# Patient Record
Sex: MALE | Race: BLACK OR AFRICAN AMERICAN | NOT HISPANIC OR LATINO | Employment: FULL TIME | URBAN - METROPOLITAN AREA
[De-identification: names, ages, dates, MRNs, and addresses within clinical notes are randomized per-mention and may not be internally consistent; named-entity substitution may affect disease eponyms.]

---

## 2019-01-04 ENCOUNTER — APPOINTMENT (EMERGENCY)
Dept: RADIOLOGY | Facility: HOSPITAL | Age: 44
End: 2019-01-04

## 2019-01-04 ENCOUNTER — HOSPITAL ENCOUNTER (EMERGENCY)
Facility: HOSPITAL | Age: 44
Discharge: HOME | End: 2019-01-04
Attending: EMERGENCY MEDICINE | Admitting: EMERGENCY MEDICINE

## 2019-01-04 VITALS
BODY MASS INDEX: 44.1 KG/M2 | DIASTOLIC BLOOD PRESSURE: 74 MMHG | TEMPERATURE: 98.9 F | HEART RATE: 102 BPM | RESPIRATION RATE: 18 BRPM | SYSTOLIC BLOOD PRESSURE: 146 MMHG | HEIGHT: 71 IN | OXYGEN SATURATION: 100 % | WEIGHT: 315 LBS

## 2019-01-04 DIAGNOSIS — L03.90 CELLULITIS: ICD-10-CM

## 2019-01-04 DIAGNOSIS — M25.475 SWELLING OF FIRST METATARSOPHALANGEAL (MTP) JOINT OF LEFT FOOT: Primary | ICD-10-CM

## 2019-01-04 PROCEDURE — 73630 X-RAY EXAM OF FOOT: CPT

## 2019-01-04 PROCEDURE — 99283 EMERGENCY DEPT VISIT LOW MDM: CPT

## 2019-01-04 RX ORDER — CEPHALEXIN 500 MG/1
500 CAPSULE ORAL EVERY 6 HOURS SCHEDULED
Qty: 28 CAPSULE | Refills: 0 | Status: SHIPPED | OUTPATIENT
Start: 2019-01-04 | End: 2019-01-11

## 2019-01-04 RX ORDER — CEPHALEXIN 250 MG/1
500 CAPSULE ORAL ONCE
Status: COMPLETED | OUTPATIENT
Start: 2019-01-04 | End: 2019-01-04

## 2019-01-04 RX ORDER — NAPROXEN 250 MG/1
500 TABLET ORAL ONCE
Status: COMPLETED | OUTPATIENT
Start: 2019-01-04 | End: 2019-01-04

## 2019-01-04 RX ORDER — PREDNISONE 20 MG/1
60 TABLET ORAL DAILY
Qty: 12 TABLET | Refills: 0 | Status: SHIPPED | OUTPATIENT
Start: 2019-01-04 | End: 2019-01-08

## 2019-01-04 RX ORDER — NAPROXEN 500 MG/1
500 TABLET ORAL 2 TIMES DAILY WITH MEALS
Qty: 20 TABLET | Refills: 0 | Status: SHIPPED | OUTPATIENT
Start: 2019-01-04 | End: 2019-01-14

## 2019-01-04 RX ORDER — HYDROCODONE BITARTRATE AND ACETAMINOPHEN 5; 325 MG/1; MG/1
1 TABLET ORAL EVERY 6 HOURS PRN
Qty: 12 TABLET | Refills: 0 | Status: SHIPPED | OUTPATIENT
Start: 2019-01-04 | End: 2019-01-07

## 2019-01-04 RX ORDER — PREDNISONE 20 MG/1
60 TABLET ORAL ONCE
Status: COMPLETED | OUTPATIENT
Start: 2019-01-04 | End: 2019-01-04

## 2019-01-04 RX ADMIN — PREDNISONE 60 MG: 20 TABLET ORAL at 14:40

## 2019-01-04 RX ADMIN — NAPROXEN 500 MG: 250 TABLET ORAL at 14:40

## 2019-01-04 RX ADMIN — CEPHALEXIN 500 MG: 250 CAPSULE ORAL at 14:41

## 2019-01-04 NOTE — ED PROVIDER NOTES
History  Chief Complaint   Patient presents with    Foot Pain     patient is c/o left foot pain x 1 week, denies injury or trauma  +swelling per patient  68-year-old male denies significant past medical history presenting chief complaint of left foot pain  Patient reports that he does have a history of recurrent gout which is been diagnosed clinically of his right 1st MTP joint is typically treat he reports that over the last week or so without inciting incident or injury he has had increasing pain and swelling to his left 1st MTP joint, the now radiates into his left mid proximal foot, it is worse with ambulating, he has not taken anything for this, he notes some mild pain redness swelling localized to his dorsum of his 1st left MPJ joint  Of note he notes that 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 in causes the appearance of swelling of his ankle but this is otherwise unchanged  Patient denies history of diabetes or IV drug use or other infectious risk factors he denies having fevers chills current viral symptoms other joint involvement injury complaints of headache chest pain cough shortness of breath change in activity nausea vomiting anorexia abdominal pain other change in bowels or bladder other joint pain swelling rashes or other associated symptoms  Complete review systems otherwise negative as noted            None       History reviewed  No pertinent past medical history  History reviewed  No pertinent surgical history  History reviewed  No pertinent family history  I have reviewed and agree with the history as documented  Social History   Substance Use Topics    Smoking status: Never Smoker    Smokeless tobacco: Never Used    Alcohol use Yes      Comment: occ        Review of Systems   Constitutional: Negative for activity change, appetite change, chills and fever  HENT: Negative for rhinorrhea and sore throat  Eyes: Negative for photophobia and pain     Respiratory: Negative for cough and shortness of breath  Cardiovascular: Negative for chest pain and palpitations  Gastrointestinal: Negative for abdominal pain, diarrhea, nausea and vomiting  Endocrine: Negative for polydipsia and polyphagia  Genitourinary: Negative for dysuria, frequency and urgency  Musculoskeletal: Positive for gait problem and joint swelling  Negative for arthralgias, back pain and myalgias  Left foot pain   Skin: Positive for color change  Negative for rash and wound  Allergic/Immunologic: Negative for immunocompromised state  Neurological: Negative for dizziness and weakness  Hematological: Negative for adenopathy  Does not bruise/bleed easily  Psychiatric/Behavioral: Negative for agitation and behavioral problems  All other systems reviewed and are negative  Physical Exam  Physical Exam   Constitutional: He is oriented to person, place, and time  He appears well-developed and well-nourished  No distress  Very well-appearing in no acute   HENT:   Head: Normocephalic and atraumatic  Eyes: Pupils are equal, round, and reactive to light  EOM are normal    Neck: Normal range of motion  Neck supple  No tracheal deviation present  Cardiovascular: Normal rate, regular rhythm and normal heart sounds  Exam reveals no gallop and no friction rub  No murmur heard  Pulmonary/Chest: Effort normal and breath sounds normal  He has no wheezes  He has no rales  Abdominal: Soft  Bowel sounds are normal  He exhibits no distension  There is no tenderness  There is no rebound and no guarding     Musculoskeletal:   Patient has mild swelling of his left 1st MTP joint there is some mild erythema over the dorsum of his left 1st MTP joint over the immediate distal 1st and 2nd metatarsals, patient has some mild discomfort with axial loading of his 1st left great toe otherwise there is no lymphangitis cap refill is brisk there is no warmth induration fluctuance he has no tenderness over the midfoot or calcaneus he has full range of motion of the ankle without discomfort warmth or notable effusion there is swelling over the ankle which patient notes his a lipoma but again full active and passive range of motion without discomfort there are no other acute ischemic infectious inflammatory traumatic findings   Neurological: He is alert and oriented to person, place, and time  No cranial nerve deficit  He exhibits normal muscle tone  Coordination normal    Skin: Skin is warm and dry  No rash noted  Psychiatric: He has a normal mood and affect  His behavior is normal    Nursing note and vitals reviewed  Vital Signs  ED Triage Vitals [01/04/19 1319]   Temperature Pulse Respirations Blood Pressure SpO2   98 9 °F (37 2 °C) 102 18 146/74 100 %      Temp Source Heart Rate Source Patient Position - Orthostatic VS BP Location FiO2 (%)   Oral Monitor Sitting Right arm --      Pain Score       8           Vitals:    01/04/19 1319   BP: 146/74   Pulse: 102   Patient Position - Orthostatic VS: Sitting       Visual Acuity      ED Medications  Medications   cephalexin (KEFLEX) capsule 500 mg (500 mg Oral Given 1/4/19 1441)   naproxen (NAPROSYN) tablet 500 mg (500 mg Oral Given 1/4/19 1440)   predniSONE tablet 60 mg (60 mg Oral Given 1/4/19 1440)       Diagnostic Studies  Results Reviewed     None                 XR foot 3+ views LEFT   Final Result by Blaine Richardson MD (01/04 1500)   Limited study  Generalized swelling of the foot  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  No acute osseous abnormality              Workstation performed: [id number]                    Procedures  Procedures       Phone Contacts  ED Phone Contact    ED Course  ED Course as of Jan 05 2047   Nikia Solorzano Jan 04, 2019   1514 Patient has some mild to moderate tenderness over his 1st MTP joint on left foot pain with axial loading reproduces symptoms there is some mild erythema the dorsum of his foot is well there is no fluctuance induration is clinically well without fevers has normal vital signs on exam, discussed risks benefits alternatives of possible arthrocentesis he has minimal swelling currently there is no apparent drainable collection, counseled extensively, may represent inflammatory arthritis and will treat as such although will cover with antibiotics and have an abundance of caution, there is not enough swelling to perform arthrocentesis rule out septic arthritis, will give Keflex for concern for possible overlying cellulitis on the dorsum of his left foot, very close return instructions if he is not improving if he develops worsening pain redness or swelling he has fevers at any time otherwise he will return for follow up with Podiatry for re-evaluation as instructed patient counseled extensively again understands to extensive discharge return follow-up instructions                                MDM  Number of Diagnoses or Management Options  Cellulitis:   Swelling of first metatarsophalangeal (MTP) joint of left foot:   Diagnosis management comments: 72-year-old male denies history of recurrent intermittent gout of his right 1st MTP joint it is been diagnosed clinically in the past and responded to symptom control here with 1 week of left 1st MTP joint radiates into his left midfoot, is worse with ambulation better with rest notes some mild swelling erythema immediate to the area he has no infectious risk factors no fevers chills or constitutional symptoms history of similar in the contralateral foot, on physical exam here he is afebrile normal vital signs the exception of mildly elevated blood pressure he is obese but clinically very well appearing his exam is otherwise as noted, there is not enough swelling to perform arthrocentesis of his left 1st MTP joint at this time he systemically well again without infectious risk factors discussed very remote possibility of septic arthritis of his 1st MTP joint however given his history and the muscles more likely inflammatory arthritis, less likely overlying cellulitis although after discussion with patient will treat symptomatically with NSAIDs prednisone, will give Keflex out of an abundance of caution crutches cast shoe for comfort pain control very close return instructions should he developed fevers or progression of symptoms at any time otherwise he will require podiatry follow-up close return instructions patient agreeable plan    CritCare Time    Disposition  Final diagnoses:   Swelling of first metatarsophalangeal (MTP) joint of left foot   Cellulitis     Time reflects when diagnosis was documented in both MDM as applicable and the Disposition within this note     Time User Action Codes Description Comment    1/4/2019  3:15 PM Anai Negro Add [M20 22] Rigidity of 1st MTP joint, left     1/4/2019  3:15 PM Mohan Lyn Add [M25 475] Swelling of first metatarsophalangeal (MTP) joint of left foot     1/4/2019  3:15 PM Wes Lyn Add [L03 90] Cellulitis     1/4/2019  3:16 PM Anai Negro Modify [M25 475] Swelling of first metatarsophalangeal (MTP) joint of left foot     1/4/2019  3:16 PM Mohan Lyn Remove [M20 22] Rigidity of 1st MTP joint, left       ED Disposition     ED Disposition Condition Comment    Discharge  [facility] discharge to home/self care      Condition at discharge: Good        Follow-up Information     Follow up With Specialties Details Why Contact Info Additional Information    [address] [facility] Emergency Department Emergency Medicine  If symptoms worsen [address] [facility] [address]  [phone] [place] ED, [address], [place], [facility]  2  In 1 week  [address]             Discharge Medication List as of 1/4/2019  3:18 PM      START taking these medications    Details   cephalexin (KEFLEX) 500 mg capsule Take 1 capsule (500 mg total) by mouth every 6 (six) hours for 7 days, Starting Fri 1/4/2019, Until Fri 1/11/2019, Print      HYDROcodone-acetaminophen (NORCO) 5-325 mg per tablet Take 1 tablet by mouth every 6 (six) hours as needed for pain for up to 3 days Max Daily Amount: 4 tablets, Starting Fri 1/4/2019, Until Mon 1/7/2019, Print      naproxen (NAPROSYN) 500 mg tablet Take 1 tablet (500 mg total) by mouth 2 (two) times a day with meals for 10 days, Starting Fri 1/4/2019, Until Mon 1/14/2019, Print      predniSONE 20 mg tablet Take 3 tablets (60 mg total) by mouth daily for 4 days, Starting Fri 1/4/2019, Until Tue 1/8/2019, Print           No discharge procedures on file      ED Provider  Electronically Signed by           Darwin Barr DO  01/05/19 204

## 2019-01-04 NOTE — DISCHARGE INSTRUCTIONS
As instructed you are to return immediately if this becomes worse you developed fevers with shaking chills  Or develop other concerning symptoms otherwise your Podiatry for re-evaluation as instructed as discussed      Cellulitis, Ambulatory Care   GENERAL INFORMATION:   Cellulitis  is a skin infection caused by bacteria  Common symptoms include the following:   · Fever    · A red, warm, swollen area on your skin    · Pain when the area is touched    · Bumps or blisters (abscess) that may drain pus    · Bumpy, raised skin that feels like an orange peel  Seek immediate care for the following symptoms:   · An increase in pain, redness, warmth, and size    · Red streaks coming from the infected area    · A thin, gray-brown discharge coming from your infected skin area    · A crackling under your skin when you touch it    · Purple dots or bumps on your skin, or bleeding under your skin    · New swelling and pain in your legs    · Sudden trouble breathing or chest pain  Treatment for cellulitis  may include medicines to treat the bacterial infection or decrease pain  The infection may need to be cleaned out  Damaged, dead, or infected tissue may need to be cut away to help your wound heal   Manage your symptoms:   · Elevate your wound above the level of your heart  as often as you can  This will help decrease swelling and pain  Prop your wound on pillows or blankets to keep it elevated comfortably  · Clean your wound as directed  You may need to wash the wound with soap and water  Look for signs of infection  · Wear pressure stockings as directed  The stockings are tight and put pressure on your legs  This improves blood flow and decreases swelling  Prevent cellulitis:   · Wash your hands often  Use soap and water  Wash your hands after you use the bathroom, change a child's diapers, or sneeze  Wash your hands before you prepare or eat food  Use lotion to prevent dry, cracked skin       · Do not share personal items, such as towels, clothing, and razors  · Clean exercise equipment  with germ-killing  before and after you use it  Follow up with your healthcare provider as directed:  Write down your questions so you remember to ask them during your visits  CARE AGREEMENT:   You have the right to help plan your care  Learn about your health condition and how it may be treated  Discuss treatment options with your caregivers to decide what care you want to receive  You always have the right to refuse treatment  The above information is an  only  It is not intended as medical advice for individual conditions or treatments  Talk to your doctor, nurse or pharmacist before following any medical regimen to see if it is safe and effective for you  © 2014 [address] is for End User's use only and may not be sold, redistributed or otherwise used for commercial purposes  All illustrations and images included in CareNotes® are the copyrighted property of [employer]  or Gamaliel Watts  Gout   WHAT YOU NEED TO KNOW:   Gout is a form of arthritis that causes severe joint pain, redness, swelling, and stiffness  Acute gout pain starts suddenly, gets worse quickly, and stops on its own  Acute gout can become chronic and cause permanent damage to the joints  DISCHARGE INSTRUCTIONS:   Return to the emergency department if:   · You have severe pain in one or more of your joints that you cannot tolerate  · You have a fever or redness that spreads beyond the joint area  Contact your healthcare provider if:   · You have new symptoms, such as a rash, after you start gout treatment  · Your joint pain and swelling do not go away, even after treatment  · You are not urinating as much or as often as you usually do  · You have trouble taking your gout medicines  · You have questions or concerns about your condition or care  Medicines:   You may need any of the following:  · Prescription pain medicine  may be given  Ask your healthcare provider how to take this medicine safely  Some prescription pain medicines contain acetaminophen  Do not take other medicines that contain acetaminophen without talking to your healthcare provider  Too much acetaminophen may cause liver damage  Prescription pain medicine may cause constipation  Ask your healthcare provider how to prevent or treat constipation  · NSAIDs , such as ibuprofen, help decrease swelling, pain, and fever  This medicine is available with or without a doctor's order  NSAIDs can cause stomach bleeding or kidney problems in certain people  If you take blood thinner medicine, always ask your healthcare provider if NSAIDs are safe for you  Always read the medicine label and follow directions  · Gout medicine  decreases joint pain and swelling  It may also be given to prevent new gout attacks  · Steroids  reduce inflammation and can help your joint stiffness and pain during gout attacks  · Uric acid medicine  may be given to reduce the amount of uric acid your body makes  Some medicines may help you pass more uric acid when you urinate  · Take your medicine as directed  Contact your healthcare provider if you think your medicine is not helping or if you have side effects  Tell him or her if you are allergic to any medicine  Keep a list of the medicines, vitamins, and herbs you take  Include the amounts, and when and why you take them  Bring the list or the pill bottles to follow-up visits  Carry your medicine list with you in case of an emergency  Follow up with your healthcare provider as directed:  Write down your questions so you remember to ask them during your visits  Manage gout:   · Rest your painful joint so it can heal   Your healthcare provider may recommend crutches or a walker if the affected joint is in a leg  · Apply ice to your joint  Ice decreases pain and swelling   Use an ice pack, or put crushed ice in a plastic bag  Cover the ice pack or bag with a towel before you apply it to your painful joint  Apply ice for 15 to 20 minutes every hour, or as directed  · Elevate your joint  Elevation helps reduce swelling and pain  Raise your joint above the level of your heart as often as you can  Prop your painful joint on pillows to keep it above your heart comfortably  · Go to physical therapy if directed  A physical therapist can teach you exercises to improve flexibility and range of motion  Prevent gout attacks:   · Do not eat high-purine foods  These foods include meats, seafood, asparagus, spinach, cauliflower, and some types of beans  Healthcare providers may tell you to eat more low-fat milk products, such as yogurt  Milk products may decrease your risk for gout attacks  Vitamin C and coffee may also help  Your healthcare provider or dietitian can help you create a meal plan  · Drink more liquids as directed  Liquids such as water help remove uric acid from your body  Ask how much liquid to drink each day and which liquids are best for you  · Manage your weight  Weight loss may decrease the amount of uric acid in your body  Exercise can help you lose weight  Talk to your healthcare provider about the best exercises for you  · Control your blood sugar level if you have diabetes  Keep your blood sugar level in a normal range  This can help prevent gout attacks  · Limit or do not drink alcohol as directed  Alcohol can trigger a gout attack  Alcohol also increases your risk for dehydration  Ask your healthcare provider if alcohol is safe for you  © 2017 [address] Information is for End User's use only and may not be sold, redistributed or otherwise used for commercial purposes  All illustrations and images included in CareNotes® are the copyrighted property of [employer]  or [employer]aliel Watts    The above information is an educational aid only  It is not intended as medical advice for individual conditions or treatments  Talk to your doctor, nurse or pharmacist before following any medical regimen to see if it is safe and effective for you

## 2021-03-17 ENCOUNTER — APPOINTMENT (EMERGENCY)
Dept: RADIOLOGY | Facility: HOSPITAL | Age: 46
End: 2021-03-17

## 2021-03-17 ENCOUNTER — HOSPITAL ENCOUNTER (EMERGENCY)
Facility: HOSPITAL | Age: 46
Discharge: HOME | End: 2021-03-17
Attending: EMERGENCY MEDICINE | Admitting: EMERGENCY MEDICINE
Payer: COMMERCIAL

## 2021-03-17 VITALS
BODY MASS INDEX: 58.58 KG/M2 | HEART RATE: 83 BPM | DIASTOLIC BLOOD PRESSURE: 81 MMHG | RESPIRATION RATE: 19 BRPM | OXYGEN SATURATION: 99 % | TEMPERATURE: 98.3 F | SYSTOLIC BLOOD PRESSURE: 174 MMHG | WEIGHT: 315 LBS

## 2021-03-17 DIAGNOSIS — M25.532 LEFT WRIST PAIN: Primary | ICD-10-CM

## 2021-03-17 PROCEDURE — 73110 X-RAY EXAM OF WRIST: CPT

## 2021-03-17 PROCEDURE — 99283 EMERGENCY DEPT VISIT LOW MDM: CPT

## 2021-03-17 PROCEDURE — 96372 THER/PROPH/DIAG INJ SC/IM: CPT

## 2021-03-17 PROCEDURE — 99284 EMERGENCY DEPT VISIT MOD MDM: CPT | Performed by: EMERGENCY MEDICINE

## 2021-03-17 RX ORDER — PREDNISONE 20 MG/1
40 TABLET ORAL DAILY
Qty: 10 TABLET | Refills: 0 | Status: SHIPPED | OUTPATIENT
Start: 2021-03-17 | End: 2021-03-22

## 2021-03-17 RX ORDER — KETOROLAC TROMETHAMINE 30 MG/ML
15 INJECTION, SOLUTION INTRAMUSCULAR; INTRAVENOUS ONCE
Status: COMPLETED | OUTPATIENT
Start: 2021-03-17 | End: 2021-03-17

## 2021-03-17 RX ADMIN — PREDNISONE 50 MG: 20 TABLET ORAL at 20:35

## 2021-03-17 RX ADMIN — KETOROLAC TROMETHAMINE 15 MG: 30 INJECTION, SOLUTION INTRAMUSCULAR at 19:39

## 2021-03-17 NOTE — Clinical Note
Yvonne Alexa was seen and treated in our emergency department on 3/17/2021  No use of L wrist for 7 days    Diagnosis:     Dwayne Ferguson  may return to work on return date  He may return on this date: 03/18/2021         If you have any questions or concerns, please don't hesitate to call        Erna Bernstein MD    ______________________________           _______________          _______________  Hospital Representative                              Date                                Time

## 2021-03-17 NOTE — Clinical Note
Deonna Mccoygo was seen and treated in our emergency department on 3/17/2021  No use of L wrist for 7 days    Diagnosis:     Acndelario Ao  may return to work on return date  He may return on this date: 03/18/2021         If you have any questions or concerns, please don't hesitate to call        Tammy Watts MD    ______________________________           _______________          _______________  Hospital Representative                              Date                                Time

## 2021-03-24 NOTE — ED PROVIDER NOTES
History  Chief Complaint   Patient presents with    Wrist Pain     L wrist pain for 3 days  unknown injury, hx of gout      38 yo m presenting to the emergency department for eval of wrist pain  Has aching left wrist pain and swelling, no injury, no numbness or tingling, no radiation  has hx of gout and ws treated with prednisone in 2019 for similar sx in foot  Prior to Admission Medications   Prescriptions Last Dose Informant Patient Reported? Taking?   naproxen (NAPROSYN) 500 mg tablet   No No   Sig: Take 1 tablet (500 mg total) by mouth 2 (two) times a day with meals for 10 days      Facility-Administered Medications: None       Past Medical History:   Diagnosis Date    Gout        History reviewed  No pertinent surgical history  History reviewed  No pertinent family history  I have reviewed and agree with the history as documented  E-Cigarette/Vaping     E-Cigarette/Vaping Substances     Social History     Tobacco Use    Smoking status: Never Smoker    Smokeless tobacco: Never Used   Substance Use Topics    Alcohol use: Yes     Comment: occ    Drug use: No       Review of Systems   Constitutional: Negative for chills and fever  HENT: Negative for ear pain and sore throat  Eyes: Negative for pain and visual disturbance  Respiratory: Negative for cough and shortness of breath  Cardiovascular: Negative for chest pain and palpitations  Gastrointestinal: Negative for abdominal pain and vomiting  Genitourinary: Negative for dysuria and hematuria  Musculoskeletal: Positive for arthralgias  Negative for back pain  Skin: Negative for color change and rash  Neurological: Negative for seizures and syncope  All other systems reviewed and are negative  Physical Exam  Physical Exam  Vitals signs and nursing note reviewed  Constitutional:       General: He is not in acute distress  Appearance: He is well-developed  He is not diaphoretic     HENT:      Head: Normocephalic and atraumatic  Eyes:      Pupils: Pupils are equal, round, and reactive to light  Neck:      Vascular: No JVD  Trachea: No tracheal deviation  Cardiovascular:      Rate and Rhythm: Normal rate and regular rhythm  Heart sounds: Normal heart sounds  No murmur  No friction rub  No gallop  Pulmonary:      Effort: Pulmonary effort is normal  No respiratory distress  Breath sounds: Normal breath sounds  No wheezing or rales  Abdominal:      General: Bowel sounds are normal  There is no distension  Palpations: Abdomen is soft  Tenderness: There is no abdominal tenderness  There is no guarding or rebound  Musculoskeletal:      Left wrist: He exhibits tenderness and swelling  Skin:     General: Skin is warm and dry  Coloration: Skin is not pale  Neurological:      Mental Status: He is alert and oriented to person, place, and time  Cranial Nerves: No cranial nerve deficit  Motor: No abnormal muscle tone     Psychiatric:         Behavior: Behavior normal          Vital Signs  ED Triage Vitals   Temperature Pulse Respirations Blood Pressure SpO2   03/17/21 1859 03/17/21 1859 03/17/21 1859 03/17/21 1859 03/17/21 1859   98 3 °F (36 8 °C) 83 19 (!) 174/81 99 %      Temp Source Heart Rate Source Patient Position - Orthostatic VS BP Location FiO2 (%)   03/17/21 1859 03/17/21 1859 03/17/21 1859 03/17/21 1859 --   Oral Monitor Sitting Left arm       Pain Score       03/17/21 1939       9           Vitals:    03/17/21 1859   BP: (!) 174/81   Pulse: 83   Patient Position - Orthostatic VS: Sitting         Visual Acuity      ED Medications  Medications   ketorolac (TORADOL) injection 15 mg (15 mg Intramuscular Given 3/17/21 1939)   predniSONE tablet 50 mg (50 mg Oral Given 3/17/21 2035)       Diagnostic Studies  Results Reviewed     None                 XR wrist 3+ views LEFT   ED Interpretation by Kendrick Figueredo MD (03/17 2015)   No acute osseous abnormalities      Final Result by Lois Monaco MD (03/18 7073)      No acute osseous abnormality  Workstation performed: [id number]                    Procedures  Procedures         ED Course                             SBIRT 20yo+      Most Recent Value   SBIRT (25 yo +)   In order to provide better care to our patients, we are screening all of our patients for alcohol and drug use  Would it be okay to ask you these screening questions? Yes Filed at: 03/17/2021 1929   Initial Alcohol Screen: US AUDIT-C    1  How often do you have a drink containing alcohol? 2 Filed at: 03/17/2021 1929   2  How many drinks containing alcohol do you have on a typical day you are drinking? 0 Filed at: 03/17/2021 1929   3a  Male UNDER 65: How often do you have five or more drinks on one occasion? 0 Filed at: 03/17/2021 1929   3b  FEMALE Any Age, or MALE 65+: How often do you have 4 or more drinks on one occassion? 0 Filed at: 03/17/2021 1929   Audit-C Score  2 Filed at: 03/17/2021 1929   ESVIN: How many times in the past year have you    Used an illegal drug or used a prescription medication for non-medical reasons? Never Filed at: 03/17/2021 1929                    MDM  Number of Diagnoses or Management Options  Left wrist pain:   Diagnosis management comments: 38 yo m presenting to the emergency department with atraumatic joint swelling, will check xr but also give nsaid and prednisone  Disposition  Final diagnoses:   Left wrist pain     Time reflects when diagnosis was documented in both MDM as applicable and the Disposition within this note     Time User Action Codes Description Comment    3/17/2021  8:20 PM Ihsan Alonso Add [M25 532] Left wrist pain       ED Disposition     ED Disposition Condition Date/Time Comment    Discharge Stable Wed Mar 17, 2021  8:20 PM [facility] discharge to home/self care              Follow-up Information     Follow up With Specialties Details Why Contact Info [facility]   [address] [facility]  Zane Segura [address] [phone] [address] [facility], [address] [address] [facility], [address] [place] [address], [address] [facility]          Discharge Medication List as of 3/17/2021  8:33 PM      START taking these medications    Details   predniSONE 20 mg tablet Take 2 tablets (40 mg total) by mouth daily for 5 days, Starting Wed 3/17/2021, Until Mon 3/22/2021, Print         CONTINUE these medications which have NOT CHANGED    Details   naproxen (NAPROSYN) 500 mg tablet Take 1 tablet (500 mg total) by mouth 2 (two) times a day with meals for 10 days, Starting Fri 1/4/2019, Until Mon 1/14/2019, Print           No discharge procedures on file      PDMP Review     None          ED Provider  Electronically Signed by           Ko Nesbitt MD  03/25/21 7673

## 2021-04-01 ENCOUNTER — OFFICE VISIT (OUTPATIENT)
Dept: OBGYN CLINIC | Facility: CLINIC | Age: 46
End: 2021-04-01
Payer: COMMERCIAL

## 2021-04-01 VITALS
HEART RATE: 83 BPM | HEIGHT: 71 IN | SYSTOLIC BLOOD PRESSURE: 129 MMHG | DIASTOLIC BLOOD PRESSURE: 85 MMHG | BODY MASS INDEX: 44.1 KG/M2 | WEIGHT: 315 LBS

## 2021-04-01 DIAGNOSIS — R20.0 NUMBNESS AND TINGLING IN LEFT HAND: ICD-10-CM

## 2021-04-01 DIAGNOSIS — S66.912A STRAIN OF LEFT WRIST, INITIAL ENCOUNTER: Primary | ICD-10-CM

## 2021-04-01 DIAGNOSIS — R20.2 NUMBNESS AND TINGLING IN LEFT HAND: ICD-10-CM

## 2021-04-01 PROCEDURE — 99203 OFFICE O/P NEW LOW 30 MIN: CPT | Performed by: ORTHOPAEDIC SURGERY

## 2021-04-01 PROCEDURE — 20605 DRAIN/INJ JOINT/BURSA W/O US: CPT | Performed by: ORTHOPAEDIC SURGERY

## 2021-04-01 RX ORDER — TRIAMCINOLONE ACETONIDE 40 MG/ML
20 INJECTION, SUSPENSION INTRA-ARTICULAR; INTRAMUSCULAR
Status: COMPLETED | OUTPATIENT
Start: 2021-04-01 | End: 2021-04-01

## 2021-04-01 RX ORDER — LIDOCAINE HYDROCHLORIDE 10 MG/ML
0.5 INJECTION, SOLUTION INFILTRATION; PERINEURAL
Status: COMPLETED | OUTPATIENT
Start: 2021-04-01 | End: 2021-04-01

## 2021-04-01 RX ADMIN — LIDOCAINE HYDROCHLORIDE 0.5 ML: 10 INJECTION, SOLUTION INFILTRATION; PERINEURAL at 11:02

## 2021-04-01 RX ADMIN — TRIAMCINOLONE ACETONIDE 20 MG: 40 INJECTION, SUSPENSION INTRA-ARTICULAR; INTRAMUSCULAR at 11:02

## 2021-04-01 NOTE — PROGRESS NOTES
CHIEF COMPLAINT:  Chief Complaint   Patient presents with    Left Wrist - Pain       SUBJECTIVE:  Michell Lima is a 39y o  year old  male who presents to the office for evaluation of his left wrist  Pt was seen at ED 3/17/2021 due to pain in his left wrist that had been ongoing for 3 days  Patient denies injury  Patient does have history of gout in his foot and was treated with prednisone in 2019  Today patient states that  The prednisone that he was prescribed at the emergency department provided him with resolution of of the swelling and some pain improvement  PAST MEDICAL HISTORY:  Past Medical History:   Diagnosis Date    Gout        PAST SURGICAL HISTORY:  History reviewed  No pertinent surgical history  FAMILY HISTORY:  History reviewed  No pertinent family history  SOCIAL HISTORY:  Social History     Tobacco Use    Smoking status: Never Smoker    Smokeless tobacco: Never Used   Substance Use Topics    Alcohol use: Yes     Comment: occ    Drug use: No       MEDICATIONS:    Current Outpatient Medications:     naproxen (NAPROSYN) 500 mg tablet, Take 1 tablet (500 mg total) by mouth 2 (two) times a day with meals for 10 days, Disp: 20 tablet, Rfl: 0    ALLERGIES:  Allergies   Allergen Reactions    Eggs Or Egg-Derived Products - Food Allergy Hives    Milk-Related Compounds - Food Allergy GI Intolerance    Shellfish-Derived Products - Food Allergy Hives       REVIEW OF SYSTEMS:  Review of Systems   Constitutional: Negative for chills, fever and unexpected weight change  HENT: Negative for hearing loss, nosebleeds and sore throat  Eyes: Negative for pain, redness and visual disturbance  Respiratory: Negative for cough, shortness of breath and wheezing  Cardiovascular: Negative for chest pain, palpitations and leg swelling  Gastrointestinal: Negative for abdominal pain, nausea and vomiting  Endocrine: Negative for polydipsia and polyuria     Genitourinary: Negative for dysuria and hematuria  Skin: Negative for rash and wound  Neurological: Negative for dizziness, light-headedness and headaches  Psychiatric/Behavioral: Negative for decreased concentration, dysphoric mood and suicidal ideas  The patient is not nervous/anxious  VITALS:  Vitals:    04/01/21 1022   BP: 129/85   Pulse: 83       LABS:  HgA1c: No results found for: HGBA1C  BMP: No results found for: GLUCOSE, CALCIUM, NA, K, CO2, CL, BUN, CREATININE    _____________________________________________________  PHYSICAL EXAMINATION:  General: well developed and well nourished, alert, oriented times 3 and appears comfortable  Psychiatric: Normal  HEENT: Trachea Midline, No torticollis  Pulmonary: No audible wheezing or strider  Cardiovascular: No discernable arrhythmia   Skin: No masses, erythema, lacerations, fluctation, ulcerations  Neurovascular: Sensation Intact to the Median, Ulnar, Radial Nerve, Motor Intact to the Median, Ulnar, Radial Nerve and Pulses Intact    MUSCULOSKELETAL EXAMINATION:  Left wrist    no swelling erythema or ecchymosis  Tender palpation of the ulnar carpal ligament   Two point discrimination is 4 mm throughout  ___________________________________________________  STUDIES REVIEWED:  xrays of the left wrist performed 3/17/2021 show no acute fracture or dislocation       PROCEDURES PERFORMED:  Medium joint arthrocentesis: L ulnocarpal  Universal Protocol:  Procedure performed by:  Consent: Verbal consent obtained    Risks and benefits: risks, benefits and alternatives were discussed  Consent given by: patient  Patient understanding: patient states understanding of the procedure being performed  Patient consent: the patient's understanding of the procedure matches consent given  Procedure consent: procedure consent matches procedure scheduled  Site marked: the operative site was marked  Supporting Documentation  Indications: pain   Procedure Details  Location: wrist - L ulnocarpal  Preparation: Patient was prepped and draped in the usual sterile fashion  Ultrasound guidance: no  Medications administered: 0 5 mL lidocaine 1 %; 20 mg triamcinolone acetonide 40 mg/mL    Patient tolerance: patient tolerated the procedure well with no immediate complications  Dressing:  Sterile dressing applied            _____________________________________________________  ASSESSMENT/PLAN:  Left ulnocarpal ligament strain   - CSI was offered and accepted   - CSI was administered without complications  - Pt was provided with a note to return to work per his request 4/8/2021    Left hand numbness and tingling  - LUE EDx was ordered and pt will follow up in the office after       Follow Up:  Return in about 2 weeks (around 4/15/2021)      Work/school status:  RTW without limitations 4/8/2021    To Do Next Visit:  Re-evaluation of current issue      Scribe Attestation    I,:  Asuncion Bonilla am acting as a scribe while in the presence of the attending physician :       I,:  Alli Hyatt MD personally performed the services described in this documentation    as scribed in my presence :

## 2021-04-01 NOTE — LETTER
April 1, 2021     Patient: Meliza Baugh   YOB: 1975   Date of Visit: 4/1/2021       To Whom it May Concern:    Meliza Baugh is under my professional care  He was seen in my office on 4/1/2021  He may return to work without limitations 4/8/2021  If you have any questions or concerns, please don't hesitate to call           Sincerely,          Alli Hyatt MD        CC: No Recipients

## 2021-05-18 ENCOUNTER — TELEPHONE (OUTPATIENT)
Dept: NEUROLOGY | Facility: CLINIC | Age: 46
End: 2021-05-18

## 2023-02-13 ENCOUNTER — HOSPITAL ENCOUNTER (EMERGENCY)
Facility: HOSPITAL | Age: 48
Discharge: HOME | End: 2023-02-13
Attending: EMERGENCY MEDICINE

## 2023-02-13 VITALS
HEART RATE: 100 BPM | RESPIRATION RATE: 16 BRPM | DIASTOLIC BLOOD PRESSURE: 89 MMHG | TEMPERATURE: 98 F | OXYGEN SATURATION: 98 % | SYSTOLIC BLOOD PRESSURE: 140 MMHG

## 2023-02-13 DIAGNOSIS — J02.9 PHARYNGITIS: ICD-10-CM

## 2023-02-13 DIAGNOSIS — M10.9 GOUTY ARTHRITIS OF LEFT GREAT TOE: Primary | ICD-10-CM

## 2023-02-13 RX ORDER — OXYCODONE HYDROCHLORIDE AND ACETAMINOPHEN 5; 325 MG/1; MG/1
1 TABLET ORAL ONCE
Status: COMPLETED | OUTPATIENT
Start: 2023-02-13 | End: 2023-02-13

## 2023-02-13 RX ORDER — KETOROLAC TROMETHAMINE 30 MG/ML
15 INJECTION, SOLUTION INTRAMUSCULAR; INTRAVENOUS ONCE
Status: COMPLETED | OUTPATIENT
Start: 2023-02-13 | End: 2023-02-13

## 2023-02-13 RX ORDER — PREDNISONE 10 MG/1
TABLET ORAL
Qty: 30 TABLET | Refills: 0 | Status: SHIPPED | OUTPATIENT
Start: 2023-02-13 | End: 2023-02-13 | Stop reason: CLARIF

## 2023-02-13 RX ORDER — NAPROXEN 500 MG/1
500 TABLET ORAL 2 TIMES DAILY WITH MEALS
Qty: 30 TABLET | Refills: 0 | Status: SHIPPED | OUTPATIENT
Start: 2023-02-13 | End: 2023-02-13 | Stop reason: CLARIF

## 2023-02-13 RX ORDER — AZITHROMYCIN 250 MG/1
TABLET, FILM COATED ORAL
Qty: 6 TABLET | Refills: 0 | Status: SHIPPED | OUTPATIENT
Start: 2023-02-13 | End: 2023-02-17

## 2023-02-13 RX ORDER — PREDNISONE 10 MG/1
TABLET ORAL
Qty: 30 TABLET | Refills: 0 | Status: SHIPPED | OUTPATIENT
Start: 2023-02-13

## 2023-02-13 RX ORDER — NAPROXEN 500 MG/1
500 TABLET ORAL 2 TIMES DAILY WITH MEALS
Qty: 30 TABLET | Refills: 0 | Status: SHIPPED | OUTPATIENT
Start: 2023-02-13

## 2023-02-13 RX ORDER — PREDNISONE 20 MG/1
40 TABLET ORAL ONCE
Status: COMPLETED | OUTPATIENT
Start: 2023-02-13 | End: 2023-02-13

## 2023-02-13 RX ADMIN — PREDNISONE 40 MG: 20 TABLET ORAL at 11:05

## 2023-02-13 RX ADMIN — KETOROLAC TROMETHAMINE 15 MG: 30 INJECTION, SOLUTION INTRAMUSCULAR at 11:05

## 2023-02-13 RX ADMIN — OXYCODONE HYDROCHLORIDE AND ACETAMINOPHEN 1 TABLET: 5; 325 TABLET ORAL at 11:05

## 2023-02-13 NOTE — ED PROVIDER NOTES
History  Chief Complaint   Patient presents with   • Foot Swelling     Left sided foot swelling, thinks it may be gout     Bob Schulz is a 47-year-old male with past medical history including gout arriving to the emergency department for evaluation with chief complaint of pain in the left great toe since last night  Patient reports that the location of pain/swelling is similar to prior flares of his gout  He reports that he was started on penicillin Thursday for treatment of strep throat  He is unsure if this had triggered this gout flare  He states that certain beers/foods usually trigger his gout flares but he denies any ingestion of foods known to flare his gout  He denies fevers, chills, sweats, extremity tingling or weakness  He offers no other complaints or concerns at this time  Prior to Admission Medications   Prescriptions Last Dose Informant Patient Reported? Taking?   naproxen (NAPROSYN) 500 mg tablet   No No   Sig: Take 1 tablet (500 mg total) by mouth 2 (two) times a day with meals for 10 days      Facility-Administered Medications: None       Past Medical History:   Diagnosis Date   • Gout        History reviewed  No pertinent surgical history  History reviewed  No pertinent family history  I have reviewed and agree with the history as documented  E-Cigarette/Vaping     E-Cigarette/Vaping Substances     Social History     Tobacco Use   • Smoking status: Never   • Smokeless tobacco: Never   Substance Use Topics   • Alcohol use: Yes     Comment: occ   • Drug use: No       Review of Systems   Constitutional: Negative for chills, diaphoresis and fever  Musculoskeletal: Positive for arthralgias and joint swelling  Left great toe       Physical Exam  Physical Exam  Vitals and nursing note reviewed  Constitutional:       General: He is not in acute distress  Appearance: Normal appearance  He is obese  He is not ill-appearing, toxic-appearing or diaphoretic     HENT: Head: Normocephalic and atraumatic  Right Ear: External ear normal       Left Ear: External ear normal    Eyes:      Conjunctiva/sclera: Conjunctivae normal    Cardiovascular:      Rate and Rhythm: Normal rate  Pulmonary:      Effort: Pulmonary effort is normal    Musculoskeletal:      Cervical back: Normal range of motion and neck supple  Feet:      Left foot:      Skin integrity: No skin breakdown  Comments: Erythema and edema overlying the left 1st MTP with tenderness upon palpation  Patient able to wiggle all toes and range the foot/ankle without issue  Distal sensation intact, and normal capillary refill in all toes  DP pulse 2+  Skin:     General: Skin is warm and dry  Capillary Refill: Capillary refill takes less than 2 seconds  Neurological:      Mental Status: He is alert  Vital Signs  ED Triage Vitals [02/13/23 1013]   Temperature Pulse Respirations Blood Pressure SpO2   98 °F (36 7 °C) 100 16 140/89 98 %      Temp src Heart Rate Source Patient Position - Orthostatic VS BP Location FiO2 (%)   -- -- -- -- --      Pain Score       --           Vitals:    02/13/23 1013   BP: 140/89   Pulse: 100         Visual Acuity      ED Medications  Medications   predniSONE tablet 40 mg (40 mg Oral Given 2/13/23 1105)   oxyCODONE-acetaminophen (PERCOCET) 5-325 mg per tablet 1 tablet (1 tablet Oral Given 2/13/23 1105)   ketorolac (TORADOL) injection 15 mg (15 mg Intramuscular Given 2/13/23 1105)       Diagnostic Studies  Results Reviewed     None                 No orders to display              Procedures  Procedures         ED Course                               SBIRT 20yo+    Flowsheet Row Most Recent Value   SBIRT (23 yo +)    In order to provide better care to our patients, we are screening all of our patients for alcohol and drug use  Would it be okay to ask you these screening questions?  No Filed at: 02/13/2023 1110                    Medical Decision Making  MDM: 53-year-old male presenting for evaluation of pain and swelling in the left great toe, stating symptoms feel like prior gout flares  He has no systemic complaints  He is neurovascularly intact  Infectious etiology felt to be less likely  Patient declined x-ray  Will treat gout with prednisone, first dose given in ED  Recommended continuing medications as directed and considering outpatient podiatry follow-up which is included in his discharge paperwork  Parameters for ED return discussed at length  Patient comfortable and agreeable with discharge, and was discharged in stable condition  Pharyngitis: acute illness or injury  Risk  Prescription drug management  Disposition  Final diagnoses:   Pharyngitis   Gouty arthritis of left great toe     Time reflects when diagnosis was documented in both MDM as applicable and the Disposition within this note     Time User Action Codes Description Comment    2/13/2023 10:56 AM Ankur Hands Add [M10 9] Gouty arthritis of right great toe     2/13/2023 10:56 AM Ankur Hands Add [J02 9] Pharyngitis     2/13/2023  2:22 PM Ankur Hands Add [M10 9] Gouty arthritis of left great toe     2/13/2023  2:22 PM Ankur Hands Modify [M10 9] Gouty arthritis of right great toe     2/13/2023  2:22 PM Ankur Hands Modify [M10 9] Gouty arthritis of left great toe     2/13/2023  2:22 PM Ankur Hands Modify [J02 9] Pharyngitis     2/13/2023  2:22 PM Ankur Hands Modify [M10 9] Gouty arthritis of left great toe     2/13/2023  2:22 PM Ankur Hands Remove [M10 9] Gouty arthritis of right great toe       ED Disposition     ED Disposition   Discharge    Condition   Stable    Date/Time   Mon Feb 13, 2023 10:56 AM    Comment   Leslee Ho discharge to home/self care                 Follow-up Information     Follow up With Specialties Details Why Contact Info Additional [address] [facility] Internal Medicine   [address] [place] [address] [facility], [address] [place], [place], [address] [facility]    [facility]  Call   [address] [facility]  [place] [address]  [phone]     [facility] Emergency Department Emergency Medicine  If symptoms worsen [address] Emergency Department, [address] [place], [place], [address]          Discharge Medication List as of 2/13/2023 11:05 AM      START taking these medications    Details   azithromycin (Zithromax Z-Chuck) 250 mg tablet Take 2 tablets today then 1 tablet daily x 4 days, Normal      predniSONE 10 mg tablet 50 mg for 2 days, 40 mg for 2 days, 30 mg for 2 days, 20 mg for 2 days, 10 mg for 2 days, Normal         CONTINUE these medications which have CHANGED    Details   naproxen (Naprosyn) 500 mg tablet Take 1 tablet (500 mg total) by mouth 2 (two) times a day with meals, Starting Mon 2/13/2023, Normal             No discharge procedures on file      PDMP Review     None          ED Provider  Electronically Signed by           Emily Rowe PA-C  02/13/23 3699

## 2023-02-13 NOTE — DISCHARGE INSTRUCTIONS
Rest, ice, tylenol/naproxen for pain relief, and continue prednisone course as directed  Consider establishing with Podiatry  Return to the ED with any new or worsening symptoms as discussed

## 2025-04-14 ENCOUNTER — HOSPITAL ENCOUNTER (OUTPATIENT)
Facility: HOSPITAL | Age: 50
Setting detail: OBSERVATION
Discharge: HOME | End: 2025-04-16
Attending: STUDENT IN AN ORGANIZED HEALTH CARE EDUCATION/TRAINING PROGRAM | Admitting: STUDENT IN AN ORGANIZED HEALTH CARE EDUCATION/TRAINING PROGRAM
Payer: COMMERCIAL

## 2025-04-14 DIAGNOSIS — R73.9 HYPERGLYCEMIA: ICD-10-CM

## 2025-04-14 DIAGNOSIS — R03.0 ELEVATED BLOOD PRESSURE READING: ICD-10-CM

## 2025-04-14 DIAGNOSIS — E11.9 DIABETES MELLITUS, NEW ONSET (HCC): Primary | ICD-10-CM

## 2025-04-14 PROBLEM — I10 HTN (HYPERTENSION): Status: ACTIVE | Noted: 2025-04-14

## 2025-04-14 LAB
ANION GAP SERPL CALCULATED.3IONS-SCNC: 9 MMOL/L (ref 4–13)
BACTERIA UR QL AUTO: ABNORMAL /HPF
BASOPHILS # BLD AUTO: 0.04 THOUSANDS/ÂΜL (ref 0–0.1)
BASOPHILS NFR BLD AUTO: 1 % (ref 0–1)
BILIRUB UR QL STRIP: NEGATIVE
BUN SERPL-MCNC: 13 MG/DL (ref 5–25)
CALCIUM SERPL-MCNC: 9.3 MG/DL (ref 8.4–10.2)
CHLORIDE SERPL-SCNC: 101 MMOL/L (ref 96–108)
CHOLEST SERPL-MCNC: 189 MG/DL (ref ?–200)
CLARITY UR: CLEAR
CO2 SERPL-SCNC: 27 MMOL/L (ref 21–32)
COLOR UR: ABNORMAL
CREAT SERPL-MCNC: 0.96 MG/DL (ref 0.6–1.3)
EOSINOPHIL # BLD AUTO: 0.1 THOUSAND/ÂΜL (ref 0–0.61)
EOSINOPHIL NFR BLD AUTO: 1 % (ref 0–6)
ERYTHROCYTE [DISTWIDTH] IN BLOOD BY AUTOMATED COUNT: 13.6 % (ref 11.6–15.1)
FLUAV AG UPPER RESP QL IA.RAPID: NEGATIVE
FLUBV AG UPPER RESP QL IA.RAPID: NEGATIVE
GFR SERPL CREATININE-BSD FRML MDRD: 93 ML/MIN/1.73SQ M
GLUCOSE SERPL-MCNC: 275 MG/DL (ref 65–140)
GLUCOSE SERPL-MCNC: 300 MG/DL (ref 65–140)
GLUCOSE SERPL-MCNC: 341 MG/DL (ref 65–140)
GLUCOSE SERPL-MCNC: 347 MG/DL (ref 65–140)
GLUCOSE UR STRIP-MCNC: ABNORMAL MG/DL
HCT VFR BLD AUTO: 44.5 % (ref 36.5–49.3)
HDLC SERPL-MCNC: 68 MG/DL
HGB BLD-MCNC: 14.8 G/DL (ref 12–17)
HGB UR QL STRIP.AUTO: NEGATIVE
IMM GRANULOCYTES # BLD AUTO: 0.03 THOUSAND/UL (ref 0–0.2)
IMM GRANULOCYTES NFR BLD AUTO: 0 % (ref 0–2)
KETONES UR STRIP-MCNC: ABNORMAL MG/DL
LDLC SERPL CALC-MCNC: 102 MG/DL (ref 0–100)
LEUKOCYTE ESTERASE UR QL STRIP: ABNORMAL
LYMPHOCYTES # BLD AUTO: 1.86 THOUSANDS/ÂΜL (ref 0.6–4.47)
LYMPHOCYTES NFR BLD AUTO: 23 % (ref 14–44)
MCH RBC QN AUTO: 27.9 PG (ref 26.8–34.3)
MCHC RBC AUTO-ENTMCNC: 33.3 G/DL (ref 31.4–37.4)
MCV RBC AUTO: 84 FL (ref 82–98)
MONOCYTES # BLD AUTO: 0.58 THOUSAND/ÂΜL (ref 0.17–1.22)
MONOCYTES NFR BLD AUTO: 7 % (ref 4–12)
NEUTROPHILS # BLD AUTO: 5.5 THOUSANDS/ÂΜL (ref 1.85–7.62)
NEUTS SEG NFR BLD AUTO: 68 % (ref 43–75)
NITRITE UR QL STRIP: NEGATIVE
NON-SQ EPI CELLS URNS QL MICRO: ABNORMAL /HPF
NONHDLC SERPL-MCNC: 121 MG/DL
NRBC BLD AUTO-RTO: 0 /100 WBCS
PH UR STRIP.AUTO: 5.5 [PH]
PLATELET # BLD AUTO: 259 THOUSANDS/UL (ref 149–390)
PMV BLD AUTO: 10.5 FL (ref 8.9–12.7)
POTASSIUM SERPL-SCNC: 4 MMOL/L (ref 3.5–5.3)
PROT UR STRIP-MCNC: NEGATIVE MG/DL
RBC # BLD AUTO: 5.31 MILLION/UL (ref 3.88–5.62)
RBC #/AREA URNS AUTO: ABNORMAL /HPF
SARS-COV+SARS-COV-2 AG RESP QL IA.RAPID: NEGATIVE
SODIUM SERPL-SCNC: 137 MMOL/L (ref 135–147)
SP GR UR STRIP.AUTO: 1.02 (ref 1–1.03)
TRIGL SERPL-MCNC: 97 MG/DL (ref ?–150)
UROBILINOGEN UR STRIP-ACNC: <2 MG/DL
WBC # BLD AUTO: 8.11 THOUSAND/UL (ref 4.31–10.16)
WBC #/AREA URNS AUTO: ABNORMAL /HPF

## 2025-04-14 PROCEDURE — 82948 REAGENT STRIP/BLOOD GLUCOSE: CPT

## 2025-04-14 PROCEDURE — 99222 1ST HOSP IP/OBS MODERATE 55: CPT | Performed by: STUDENT IN AN ORGANIZED HEALTH CARE EDUCATION/TRAINING PROGRAM

## 2025-04-14 PROCEDURE — 80048 BASIC METABOLIC PNL TOTAL CA: CPT | Performed by: PHYSICIAN ASSISTANT

## 2025-04-14 PROCEDURE — 83036 HEMOGLOBIN GLYCOSYLATED A1C: CPT

## 2025-04-14 PROCEDURE — 87804 INFLUENZA ASSAY W/OPTIC: CPT | Performed by: PHYSICIAN ASSISTANT

## 2025-04-14 PROCEDURE — 93005 ELECTROCARDIOGRAM TRACING: CPT

## 2025-04-14 PROCEDURE — 36415 COLL VENOUS BLD VENIPUNCTURE: CPT | Performed by: PHYSICIAN ASSISTANT

## 2025-04-14 PROCEDURE — 99285 EMERGENCY DEPT VISIT HI MDM: CPT | Performed by: PHYSICIAN ASSISTANT

## 2025-04-14 PROCEDURE — 85025 COMPLETE CBC W/AUTO DIFF WBC: CPT | Performed by: PHYSICIAN ASSISTANT

## 2025-04-14 PROCEDURE — 81001 URINALYSIS AUTO W/SCOPE: CPT | Performed by: PHYSICIAN ASSISTANT

## 2025-04-14 PROCEDURE — 99283 EMERGENCY DEPT VISIT LOW MDM: CPT

## 2025-04-14 PROCEDURE — 87811 SARS-COV-2 COVID19 W/OPTIC: CPT | Performed by: PHYSICIAN ASSISTANT

## 2025-04-14 PROCEDURE — 80061 LIPID PANEL: CPT

## 2025-04-14 RX ORDER — AMLODIPINE BESYLATE 5 MG/1
5 TABLET ORAL DAILY
Status: CANCELLED | OUTPATIENT
Start: 2025-04-14

## 2025-04-14 RX ORDER — ACETAMINOPHEN 325 MG/1
650 TABLET ORAL EVERY 6 HOURS PRN
Status: DISCONTINUED | OUTPATIENT
Start: 2025-04-14 | End: 2025-04-16 | Stop reason: HOSPADM

## 2025-04-14 RX ORDER — ONDANSETRON 2 MG/ML
4 INJECTION INTRAMUSCULAR; INTRAVENOUS EVERY 6 HOURS PRN
Status: DISCONTINUED | OUTPATIENT
Start: 2025-04-14 | End: 2025-04-16 | Stop reason: HOSPADM

## 2025-04-14 RX ORDER — INSULIN GLARGINE 100 [IU]/ML
8 INJECTION, SOLUTION SUBCUTANEOUS
Status: DISCONTINUED | OUTPATIENT
Start: 2025-04-14 | End: 2025-04-14

## 2025-04-14 RX ORDER — LISINOPRIL 5 MG/1
5 TABLET ORAL DAILY
Status: DISCONTINUED | OUTPATIENT
Start: 2025-04-15 | End: 2025-04-14

## 2025-04-14 RX ORDER — AMLODIPINE BESYLATE 5 MG/1
5 TABLET ORAL DAILY
Status: DISCONTINUED | OUTPATIENT
Start: 2025-04-14 | End: 2025-04-16 | Stop reason: HOSPADM

## 2025-04-14 RX ORDER — LISINOPRIL 5 MG/1
5 TABLET ORAL DAILY
Status: DISCONTINUED | OUTPATIENT
Start: 2025-04-14 | End: 2025-04-16 | Stop reason: HOSPADM

## 2025-04-14 RX ORDER — INSULIN LISPRO 100 [IU]/ML
1-5 INJECTION, SOLUTION INTRAVENOUS; SUBCUTANEOUS
Status: DISCONTINUED | OUTPATIENT
Start: 2025-04-14 | End: 2025-04-16 | Stop reason: HOSPADM

## 2025-04-14 RX ORDER — INSULIN GLARGINE 100 [IU]/ML
10 INJECTION, SOLUTION SUBCUTANEOUS
Status: DISCONTINUED | OUTPATIENT
Start: 2025-04-14 | End: 2025-04-14

## 2025-04-14 RX ORDER — ENOXAPARIN SODIUM 100 MG/ML
60 INJECTION SUBCUTANEOUS 2 TIMES DAILY
Status: DISCONTINUED | OUTPATIENT
Start: 2025-04-14 | End: 2025-04-16 | Stop reason: HOSPADM

## 2025-04-14 RX ORDER — INSULIN LISPRO 100 [IU]/ML
2-12 INJECTION, SOLUTION INTRAVENOUS; SUBCUTANEOUS
Status: DISCONTINUED | OUTPATIENT
Start: 2025-04-14 | End: 2025-04-16 | Stop reason: HOSPADM

## 2025-04-14 RX ORDER — HYDROXYZINE HYDROCHLORIDE 25 MG/1
25 TABLET, FILM COATED ORAL EVERY 6 HOURS PRN
Status: DISCONTINUED | OUTPATIENT
Start: 2025-04-14 | End: 2025-04-16 | Stop reason: HOSPADM

## 2025-04-14 RX ORDER — ENOXAPARIN SODIUM 100 MG/ML
40 INJECTION SUBCUTANEOUS DAILY
Status: DISCONTINUED | OUTPATIENT
Start: 2025-04-15 | End: 2025-04-14

## 2025-04-14 RX ADMIN — INSULIN LISPRO 8 UNITS: 100 INJECTION, SOLUTION INTRAVENOUS; SUBCUTANEOUS at 18:12

## 2025-04-14 RX ADMIN — SODIUM CHLORIDE 1000 ML: 0.9 INJECTION, SOLUTION INTRAVENOUS at 17:05

## 2025-04-14 RX ADMIN — ENOXAPARIN SODIUM 60 MG: 60 INJECTION SUBCUTANEOUS at 21:06

## 2025-04-14 RX ADMIN — LISINOPRIL 5 MG: 5 TABLET ORAL at 18:20

## 2025-04-14 RX ADMIN — AMLODIPINE BESYLATE 5 MG: 5 TABLET ORAL at 18:20

## 2025-04-14 RX ADMIN — INSULIN LISPRO 4 UNITS: 100 INJECTION, SOLUTION INTRAVENOUS; SUBCUTANEOUS at 21:06

## 2025-04-14 NOTE — ED PROVIDER NOTES
Time reflects when diagnosis was documented in both MDM as applicable and the Disposition within this note       Time User Action Codes Description Comment    4/14/2025  5:26 PM Isabell Hudson Add [E11.9] Diabetes mellitus, new onset (HCC)     4/14/2025  5:27 PM Isabell Hudson Add [R73.9] Hyperglycemia           ED Disposition       ED Disposition   Admit    Condition   Stable    Date/Time   Mon Apr 14, 2025  5:26 PM    Comment   Case was discussed with MARIA DOLORES and the patient's admission status was agreed to be Admission Status: observation status to the service of Dr. Robertson .               Assessment & Plan       Medical Decision Making  Differential diagnosis includes but not limited to: Upper respiratory infection, sinusitis, electrolyte disturbance, dehydration    Amount and/or Complexity of Data Reviewed  Labs: ordered. Decision-making details documented in ED Course.        ED Course as of 04/14/25 1727   Mon Apr 14, 2025   1659 Discussed test results with patient.  Blood sugar of 347 consistent with new onset diabetes.  Agreeable to admission, as he has no PCP or other means to follow-up.       Medications   sodium chloride 0.9 % bolus 1,000 mL (1,000 mL Intravenous New Bag 4/14/25 1705)       ED Risk Strat Scores                    No data recorded        SBIRT 22yo+      Flowsheet Row Most Recent Value   Initial Alcohol Screen: US AUDIT-C     1. How often do you have a drink containing alcohol? 2 Filed at: 04/14/2025 1527   2. How many drinks containing alcohol do you have on a typical day you are drinking?  0 Filed at: 04/14/2025 1527   3a. Male UNDER 65: How often do you have five or more drinks on one occasion? 0 Filed at: 04/14/2025 1527   3b. FEMALE Any Age, or MALE 65+: How often do you have 4 or more drinks on one occassion? 0 Filed at: 04/14/2025 1527   Audit-C Score 2 Filed at: 04/14/2025 1527   ESVIN: How many times in the past year have you...    Used an illegal drug or used a prescription  "medication for non-medical reasons? Never Filed at: 04/14/2025 1527                            History of Present Illness       Chief Complaint   Patient presents with    Allergies     Pt reports 2 days ago he started feeling \"like my throat was tight\" Pt took benadryl, pt also reports sinus pressure and post nasal drip.        Past Medical History:   Diagnosis Date    Gout       History reviewed. No pertinent surgical history.   History reviewed. No pertinent family history.   Social History     Tobacco Use    Smoking status: Never    Smokeless tobacco: Never   Substance Use Topics    Alcohol use: Yes     Comment: occ    Drug use: No      E-Cigarette/Vaping      E-Cigarette/Vaping Substances      I have reviewed and agree with the history as documented.     49-year-old male presents the emergency department with complaints of feeling unwell.  States that 2 days ago he developed some tightness in his throat was tight and that he may be having an allergic reaction to something.  States that he took some Benadryl and symptoms in a way.  Since then has been having some intermittent pressure in the left side of his face which she describes as a tightness.  He denies facial pain.  Notes that he has been having some postnasal drip as well.  States that his symptoms improved with some Benadryl but otherwise \"off\".  Describes a feeling of being not his normal self.  Patient denies any significant past medical history for similar symptoms.  He does not have a family doctor.  Additionally has been using some diurex tablets over-the-counter from [employer] to help with symptoms of feeling bloated.      History provided by:  Patient   used: No        Review of Systems   Constitutional:  Negative for activity change, appetite change, chills and fever.   HENT:  Positive for congestion. Negative for dental problem, drooling, ear discharge, ear pain, facial swelling, mouth sores, nosebleeds, rhinorrhea, sinus " pressure, sinus pain, sore throat and trouble swallowing.    Eyes:  Negative for pain, discharge, itching and visual disturbance.   Respiratory:  Negative for cough, chest tightness, shortness of breath and wheezing.    Cardiovascular:  Negative for chest pain and palpitations.   Gastrointestinal:  Negative for abdominal pain, blood in stool, constipation, diarrhea, nausea and vomiting.   Endocrine: Negative for cold intolerance and heat intolerance.   Genitourinary:  Positive for frequency. Negative for difficulty urinating, dysuria, flank pain and urgency.   Skin:  Negative for rash and wound.   Allergic/Immunologic: Negative for food allergies and immunocompromised state.   Neurological:  Negative for dizziness, seizures, syncope, weakness, numbness and headaches.   Psychiatric/Behavioral:  Negative for agitation, behavioral problems and confusion.            Objective       ED Triage Vitals [04/14/25 1525]   Temperature Pulse Blood Pressure Respirations SpO2 Patient Position - Orthostatic VS   97.8 °F (36.6 °C) 104 (!) 171/87 20 99 % Sitting      Temp Source Heart Rate Source BP Location FiO2 (%) Pain Score    Temporal Monitor Left arm -- --      Vitals      Date and Time Temp Pulse SpO2 Resp BP Pain Score FACES Pain Rating User   04/14/25 1525 -- -- -- 20 -- -- -- JK   04/14/25 1525 97.8 °F (36.6 °C) 104 99 % -- 171/87 -- -- LA            Physical Exam  Vitals and nursing note reviewed.   Constitutional:       General: He is not in acute distress.     Appearance: He is obese. He is not diaphoretic.   HENT:      Head: Normocephalic and atraumatic.      Right Ear: Tympanic membrane, ear canal and external ear normal.      Left Ear: Tympanic membrane, ear canal and external ear normal.      Nose: No congestion.      Mouth/Throat:      Mouth: Mucous membranes are moist.      Pharynx: No oropharyngeal exudate or posterior oropharyngeal erythema.   Eyes:      Conjunctiva/sclera: Conjunctivae normal.   Neck:       Vascular: No JVD.      Trachea: No tracheal deviation.   Cardiovascular:      Rate and Rhythm: Normal rate and regular rhythm.      Heart sounds: Normal heart sounds. No murmur heard.     No friction rub. No gallop.   Pulmonary:      Effort: Pulmonary effort is normal. No respiratory distress.      Breath sounds: Normal breath sounds. No wheezing or rales.   Chest:      Chest wall: No tenderness.   Musculoskeletal:         General: No tenderness or deformity. Normal range of motion.   Lymphadenopathy:      Cervical: No cervical adenopathy.   Skin:     General: Skin is warm and dry.      Findings: No erythema or rash.   Neurological:      General: No focal deficit present.      Mental Status: He is alert and oriented to person, place, and time.   Psychiatric:         Mood and Affect: Mood normal.         Behavior: Behavior normal.         Results Reviewed       Procedure Component Value Units Date/Time    Urine Microscopic [[id number]]  (Abnormal) Collected: 04/14/25 1707    Lab Status: Final result Specimen: Urine, Clean Catch Updated: 04/14/25 1726     RBC, UA None Seen /hpf      WBC, UA 2-4 /hpf      Epithelial Cells Occasional /hpf      Bacteria, UA None Seen /hpf     UA w Reflex to Microscopic w Reflex to Culture [[id number]]  (Abnormal) Collected: 04/14/25 1707    Lab Status: Final result Specimen: Urine, Clean Catch Updated: 04/14/25 1724     Color, UA Light Yellow     Clarity, UA Clear     Specific Gravity, UA 1.022     pH, UA 5.5     Leukocytes, UA Elevated glucose may cause decreased leukocyte values. See urine microscopic for UWBC result     Nitrite, UA Negative     Protein, UA Negative mg/dl      Glucose, UA >=1000 (1%) mg/dl      Ketones, UA Trace mg/dl      Urobilinogen, UA <2.0 mg/dl      Bilirubin, UA Negative     Occult Blood, UA Negative    FLU/COVID Rapid Antigen (30 min. TAT) - Preferred screening test in ED [[id number]]  (Normal) Collected: 04/14/25 1607    Lab Status: Final result Specimen: Nares  from Nose Updated: 04/14/25 1651     SARS COV Rapid Antigen Negative     Influenza A Rapid Antigen Negative     Influenza B Rapid Antigen Negative    Narrative:      This test has been performed using the [employer] Stephany 2 FLU+SARS Antigen test under the Emergency Use Authorization (EUA). This test has been validated by the  and verified by the performing laboratory. The Stephany uses lateral flow immunofluorescent sandwich assay to detect SARS-COV, Influenza A and Influenza B Antigen.     The [employer] Stephany 2 SARS Antigen test does not differentiate between SARS-CoV and SARS-CoV-2.     Negative results are presumptive and may be confirmed with a molecular assay, if necessary, for patient management. Negative results do not rule out SARS-CoV-2 or influenza infection and should not be used as the sole basis for treatment or patient management decisions. A negative test result may occur if the level of antigen in a sample is below the limit of detection of this test.     Positive results are indicative of the presence of viral antigens, but do not rule out bacterial infection or co-infection with other viruses.     All test results should be used as an adjunct to clinical observations and other information available to the provider.    FOR PEDIATRIC PATIENTS - copy/paste COVID Guidelines URL to browser: [email or web address]    Hemoglobin A1C [[id number]]     Lab Status: No result Specimen: Blood     Basic metabolic panel [[id number]]  (Abnormal) Collected: 04/14/25 1607    Lab Status: Final result Specimen: Blood from Arm, Right Updated: 04/14/25 1641     Sodium 137 mmol/L      Potassium 4.0 mmol/L      Chloride 101 mmol/L      CO2 27 mmol/L      ANION GAP 9 mmol/L      BUN 13 mg/dL      Creatinine 0.96 mg/dL      Glucose 347 mg/dL      Calcium 9.3 mg/dL      eGFR 93 ml/min/1.73sq m     Narrative:      National Kidney Disease Foundation guidelines for Chronic  Kidney Disease (CKD):     Stage 1 with normal or high GFR (GFR > 90 mL/min/1.73 square meters)    Stage 2 Mild CKD (GFR = 60-89 mL/min/1.73 square meters)    Stage 3A Moderate CKD (GFR = 45-59 mL/min/1.73 square meters)    Stage 3B Moderate CKD (GFR = 30-44 mL/min/1.73 square meters)    Stage 4 Severe CKD (GFR = 15-29 mL/min/1.73 square meters)    Stage 5 End Stage CKD (GFR <15 mL/min/1.73 square meters)  Note: GFR calculation is accurate only with a steady state creatinine    CBC and differential [[id number]] Collected: 25 1607    Lab Status: Final result Specimen: Blood from Arm, Right Updated: 25 1625     WBC 8.11 Thousand/uL      RBC 5.31 Million/uL      Hemoglobin 14.8 g/dL      Hematocrit 44.5 %      MCV 84 fL      MCH 27.9 pg      MCHC 33.3 g/dL      RDW 13.6 %      MPV 10.5 fL      Platelets 259 Thousands/uL      nRBC 0 /100 WBCs      Segmented % 68 %      Immature Grans % 0 %      Lymphocytes % 23 %      Monocytes % 7 %      Eosinophils Relative 1 %      Basophils Relative 1 %      Absolute Neutrophils 5.50 Thousands/µL      Absolute Immature Grans 0.03 Thousand/uL      Absolute Lymphocytes 1.86 Thousands/µL      Absolute Monocytes 0.58 Thousand/µL      Eosinophils Absolute 0.10 Thousand/µL      Basophils Absolute 0.04 Thousands/µL             No orders to display       Procedures    ED Medication and Procedure Management   Prior to Admission Medications   Prescriptions Last Dose Informant Patient Reported? Taking?   naproxen (Naprosyn) 500 mg tablet   No No   Sig: Take 1 tablet (500 mg total) by mouth 2 (two) times a day with meals   predniSONE 10 mg tablet   No No   Si mg for 2 days, 40 mg for 2 days, 30 mg for 2 days, 20 mg for 2 days, 10 mg for 2 days      Facility-Administered Medications: None     Patient's Medications   Discharge Prescriptions    No medications on file     No discharge procedures on file.  ED SEPSIS DOCUMENTATION   Time reflects when diagnosis was documented in both  MDM as applicable and the Disposition within this note       Time User Action Codes Description Comment    4/14/2025  5:26 PM Isabell Hudson [E11.9] Diabetes mellitus, new onset (HCC)     4/14/2025  5:27 PM Isabell Hudson [R73.9] Hyperglycemia                  Isabell Hudson PA-C  04/14/25 5879

## 2025-04-14 NOTE — ASSESSMENT & PLAN NOTE
"No results found for: \"HGBA1C\"    Recent Labs     04/14/25  1753   POCGLU 300*       Blood Sugar Average: Last 72 hrs:  (P) 300    Patient presenting to the ED for sinus pressure, post nasal drip and \"throat was tight\" x4 days  Additionally endorsing symptoms of polyuria, polydipsia over the last couple weeks  Glucose 347 on BMP, normal anion gap  Hgba1c pending   UA w/ trace ketones, elevated glucose. No protein present   Start lantus 10U qhs tonight + sliding scale insulin   IVF x10 hours  Will likely need insulin supplies on discharge and diabetes education   Will place endo consult to establish care given lack of PCP care  F/u lipid panel   "

## 2025-04-14 NOTE — ASSESSMENT & PLAN NOTE
/87 on arrival   Not on antihypertensives   Started on lisinopril 5mg and amlodipine 5mg daily - continue and adjust as needed

## 2025-04-14 NOTE — H&P
"H&P - Hospitalist   Name: Arron Cisse 49 y.o. male I MRN: [id number]  Unit/Bed#: ED 06 I Date of Admission: 4/14/2025   Date of Service: 4/14/2025 I Hospital Day: 0     Assessment & Plan  Diabetes mellitus, new onset (HCC)  No results found for: \"HGBA1C\"    Recent Labs     04/14/25  1753   POCGLU 300*       Blood Sugar Average: Last 72 hrs:  (P) 300    Patient presenting to the ED for sinus pressure, post nasal drip and \"throat was tight\" x4 days  Additionally endorsing symptoms of polyuria, polydipsia over the last couple weeks  Glucose 347 on BMP, normal anion gap  Hgba1c pending   UA w/ trace ketones, elevated glucose. No protein present   Start lantus 10U qhs tonight + sliding scale insulin   IVF x10 hours  Will likely need insulin supplies on discharge and diabetes education   Will place endo consult to establish care given lack of PCP care  F/u lipid panel   Elevated blood pressure reading  /87 on arrival   Not on antihypertensives   Started on lisinopril 5mg and amlodipine 5mg daily - continue and adjust as needed  BMI 50.0-59.9, adult ([facility])  Encourage healthy lifestyle modifications   VTE Pharmacologic Prophylaxis: VTE Score: 4 Moderate Risk (Score 3-4) - Pharmacological DVT Prophylaxis Ordered: enoxaparin (Lovenox).  Code Status: Level 1 - Full Code   Discussion with family: Patient declined call to .     Anticipated Length of Stay: Patient will be admitted on an observation basis with an anticipated length of stay of less than 2 midnights secondary to new onset diabetes, endo consult.    History of Present Illness   Chief Complaint: throat closing    Arron Cisse is a 49 y.o. male with PMH gout who presents with complaints of allergy related throat closing, sinus pressure, and post nasal drip. Patient reportedly took benadryl to help his symptoms however symptoms worsened today prompting ED eval. After further discussion patient reports over the last couple of weeks he has had " symptoms of polyuria and polydipsia. He reports a family history of diabetes in his father. He denies chest pain, lower extremity swelling, orthopnea, shortness of breath, nausea/vomiting/diarrhea, fevers or chills. He reports recently starting the gym about a month ago.  He will be admitted to the hospital under observation status for new onset diabetes and endocrinology consult.     Review of Systems   Constitutional:  Negative for chills and fever.   HENT: Negative.     Respiratory:  Negative for chest tightness and shortness of breath.    Cardiovascular:  Negative for chest pain, palpitations and leg swelling.   Gastrointestinal:  Negative for constipation, diarrhea and nausea.   Endocrine: Positive for polyphagia and polyuria.   Genitourinary: Negative.    Musculoskeletal: Negative.  Negative for arthralgias, joint swelling and myalgias.   Skin: Negative.    Allergic/Immunologic: Positive for food allergies.   Neurological: Negative.  Negative for numbness.   Hematological: Negative.    Psychiatric/Behavioral: Negative.         Historical Information   Past Medical History:   Diagnosis Date    Gout      History reviewed. No pertinent surgical history.  Social History     Tobacco Use    Smoking status: Never    Smokeless tobacco: Never   Substance and Sexual Activity    Alcohol use: Yes     Comment: occ    Drug use: No    Sexual activity: Not on file     E-Cigarette/Vaping     E-Cigarette/Vaping Substances     Family history non-contributory  Social History:  Marital Status: Single   Occupation: N/A  Patient Pre-hospital Living Situation: Home  Patient Pre-hospital Level of Mobility: walks  Patient Pre-hospital Diet Restrictions: None     Meds/Allergies   I have reviewed home medications with patient personally.  Prior to Admission medications    Medication Sig Start Date End Date Taking? Authorizing Provider   naproxen (Naprosyn) 500 mg tablet Take 1 tablet (500 mg total) by mouth 2 (two) times a day with meals  2/13/23   Shalini Marlow PA-C   predniSONE 10 mg tablet 50 mg for 2 days, 40 mg for 2 days, 30 mg for 2 days, 20 mg for 2 days, 10 mg for 2 days 2/13/23   Shalini Marlow PA-C     Allergies   Allergen Reactions    Eggs Or Egg-Derived Products - Food Allergy Hives    Milk-Related Compounds - Food Allergy GI Intolerance    Shellfish-Derived Products - Food Allergy Hives       Objective :  Temp:  [97.8 °F (36.6 °C)] 97.8 °F (36.6 °C)  HR:  [102-104] 102  BP: (160-171)/(87-89) 160/89  Resp:  [16-20] 16  SpO2:  [96 %-99 %] 96 %  O2 Device: None (Room air)    Physical Exam  Constitutional:       General: He is not in acute distress.     Appearance: He is obese. He is not toxic-appearing.   HENT:      Head: Normocephalic and atraumatic.      Nose: Nose normal.      Mouth/Throat:      Mouth: Mucous membranes are moist.   Eyes:      Conjunctiva/sclera: Conjunctivae normal.   Cardiovascular:      Rate and Rhythm: Normal rate.      Heart sounds: Normal heart sounds. No murmur heard.     No friction rub. No gallop.   Pulmonary:      Effort: Pulmonary effort is normal.      Breath sounds: No wheezing, rhonchi or rales.      Comments: Anterior auscultation   Abdominal:      General: There is distension.      Palpations: Abdomen is soft.      Tenderness: There is no abdominal tenderness.   Musculoskeletal:      Cervical back: Normal range of motion and neck supple.      Right lower leg: Edema present.      Left lower leg: Edema present.   Skin:     General: Skin is warm and dry.   Neurological:      General: No focal deficit present.      Mental Status: He is alert.   Psychiatric:         Mood and Affect: Mood normal.       Lines/Drains:            Lab Results: I have reviewed the following results:  Results from last 7 days   Lab Units 04/14/25  1607   WBC Thousand/uL 8.11   HEMOGLOBIN g/dL 14.8   HEMATOCRIT % 44.5   PLATELETS Thousands/uL 259   SEGS PCT % 68   LYMPHO PCT % 23   MONO PCT % 7   EOS PCT % 1     Results from  "last 7 days   Lab Units 04/14/25  1607   SODIUM mmol/L 137   POTASSIUM mmol/L 4.0   CHLORIDE mmol/L 101   CO2 mmol/L 27   BUN mg/dL 13   CREATININE mg/dL 0.96   ANION GAP mmol/L 9   CALCIUM mg/dL 9.3   GLUCOSE RANDOM mg/dL 347*         Results from last 7 days   Lab Units 04/14/25  1753   POC GLUCOSE mg/dl 300*     No results found for: \"HGBA1C\"        Imaging Results Review: No pertinent imaging studies reviewed.  Other Study Results Review: No additional pertinent studies reviewed. No EKG ordered in ED, will order to be done now     Administrative Statements   I have spent a total time of 46 minutes in caring for this patient on the day of the visit/encounter including Diagnostic results, Prognosis, Risks and benefits of tx options, Instructions for management, Patient and family education, Risk factor reductions, Counseling / Coordination of care, Documenting in the medical record, Reviewing/placing orders in the medical record (including tests, medications, and/or procedures), Obtaining or reviewing history  , and Communicating with other healthcare professionals .    ** Please Note: This note has been constructed using a voice recognition system. **    "

## 2025-04-15 LAB
ANION GAP SERPL CALCULATED.3IONS-SCNC: 9 MMOL/L (ref 4–13)
ATRIAL RATE: 109 BPM
BASOPHILS # BLD AUTO: 0.03 THOUSANDS/ÂΜL (ref 0–0.1)
BASOPHILS NFR BLD AUTO: 0 % (ref 0–1)
BUN SERPL-MCNC: 11 MG/DL (ref 5–25)
CALCIUM SERPL-MCNC: 9.5 MG/DL (ref 8.4–10.2)
CHLORIDE SERPL-SCNC: 103 MMOL/L (ref 96–108)
CO2 SERPL-SCNC: 26 MMOL/L (ref 21–32)
CREAT SERPL-MCNC: 0.9 MG/DL (ref 0.6–1.3)
EOSINOPHIL # BLD AUTO: 0.1 THOUSAND/ÂΜL (ref 0–0.61)
EOSINOPHIL NFR BLD AUTO: 2 % (ref 0–6)
ERYTHROCYTE [DISTWIDTH] IN BLOOD BY AUTOMATED COUNT: 13.8 % (ref 11.6–15.1)
GFR SERPL CREATININE-BSD FRML MDRD: 101 ML/MIN/1.73SQ M
GLUCOSE SERPL-MCNC: 211 MG/DL (ref 65–140)
GLUCOSE SERPL-MCNC: 237 MG/DL (ref 65–140)
GLUCOSE SERPL-MCNC: 268 MG/DL (ref 65–140)
GLUCOSE SERPL-MCNC: 275 MG/DL (ref 65–140)
GLUCOSE SERPL-MCNC: 279 MG/DL (ref 65–140)
HCT VFR BLD AUTO: 44.4 % (ref 36.5–49.3)
HGB BLD-MCNC: 15.1 G/DL (ref 12–17)
IMM GRANULOCYTES # BLD AUTO: 0.02 THOUSAND/UL (ref 0–0.2)
IMM GRANULOCYTES NFR BLD AUTO: 0 % (ref 0–2)
LYMPHOCYTES # BLD AUTO: 2.22 THOUSANDS/ÂΜL (ref 0.6–4.47)
LYMPHOCYTES NFR BLD AUTO: 32 % (ref 14–44)
MCH RBC QN AUTO: 28.4 PG (ref 26.8–34.3)
MCHC RBC AUTO-ENTMCNC: 34 G/DL (ref 31.4–37.4)
MCV RBC AUTO: 84 FL (ref 82–98)
MONOCYTES # BLD AUTO: 0.63 THOUSAND/ÂΜL (ref 0.17–1.22)
MONOCYTES NFR BLD AUTO: 9 % (ref 4–12)
NEUTROPHILS # BLD AUTO: 3.89 THOUSANDS/ÂΜL (ref 1.85–7.62)
NEUTS SEG NFR BLD AUTO: 57 % (ref 43–75)
NRBC BLD AUTO-RTO: 0 /100 WBCS
P AXIS: 50 DEGREES
PLATELET # BLD AUTO: 256 THOUSANDS/UL (ref 149–390)
PMV BLD AUTO: 10.4 FL (ref 8.9–12.7)
POTASSIUM SERPL-SCNC: 3.8 MMOL/L (ref 3.5–5.3)
PR INTERVAL: 156 MS
QRS AXIS: 50 DEGREES
QRSD INTERVAL: 110 MS
QT INTERVAL: 354 MS
QTC INTERVAL: 476 MS
RBC # BLD AUTO: 5.31 MILLION/UL (ref 3.88–5.62)
SODIUM SERPL-SCNC: 138 MMOL/L (ref 135–147)
T WAVE AXIS: 39 DEGREES
VENTRICULAR RATE: 109 BPM
WBC # BLD AUTO: 6.89 THOUSAND/UL (ref 4.31–10.16)

## 2025-04-15 PROCEDURE — 82948 REAGENT STRIP/BLOOD GLUCOSE: CPT

## 2025-04-15 PROCEDURE — 85025 COMPLETE CBC W/AUTO DIFF WBC: CPT

## 2025-04-15 PROCEDURE — 80048 BASIC METABOLIC PNL TOTAL CA: CPT

## 2025-04-15 PROCEDURE — 99231 SBSQ HOSP IP/OBS SF/LOW 25: CPT | Performed by: PHYSICIAN ASSISTANT

## 2025-04-15 PROCEDURE — 93010 ELECTROCARDIOGRAM REPORT: CPT | Performed by: INTERNAL MEDICINE

## 2025-04-15 PROCEDURE — 99204 OFFICE O/P NEW MOD 45 MIN: CPT | Performed by: INTERNAL MEDICINE

## 2025-04-15 RX ORDER — INSULIN GLARGINE 100 [IU]/ML
20 INJECTION, SOLUTION SUBCUTANEOUS
Status: DISCONTINUED | OUTPATIENT
Start: 2025-04-15 | End: 2025-04-16 | Stop reason: HOSPADM

## 2025-04-15 RX ORDER — METFORMIN HYDROCHLORIDE 500 MG/1
500 TABLET, EXTENDED RELEASE ORAL
Status: DISCONTINUED | OUTPATIENT
Start: 2025-04-15 | End: 2025-04-16 | Stop reason: HOSPADM

## 2025-04-15 RX ADMIN — INSULIN LISPRO 4 UNITS: 100 INJECTION, SOLUTION INTRAVENOUS; SUBCUTANEOUS at 17:30

## 2025-04-15 RX ADMIN — INSULIN LISPRO 6 UNITS: 100 INJECTION, SOLUTION INTRAVENOUS; SUBCUTANEOUS at 13:23

## 2025-04-15 RX ADMIN — LISINOPRIL 5 MG: 5 TABLET ORAL at 08:10

## 2025-04-15 RX ADMIN — METFORMIN ER 500 MG 500 MG: 500 TABLET ORAL at 17:41

## 2025-04-15 RX ADMIN — ENOXAPARIN SODIUM 60 MG: 60 INJECTION SUBCUTANEOUS at 08:10

## 2025-04-15 RX ADMIN — ENOXAPARIN SODIUM 60 MG: 60 INJECTION SUBCUTANEOUS at 21:52

## 2025-04-15 RX ADMIN — INSULIN LISPRO 2 UNITS: 100 INJECTION, SOLUTION INTRAVENOUS; SUBCUTANEOUS at 21:52

## 2025-04-15 RX ADMIN — INSULIN GLARGINE 20 UNITS: 100 INJECTION, SOLUTION SUBCUTANEOUS at 21:52

## 2025-04-15 RX ADMIN — AMLODIPINE BESYLATE 5 MG: 5 TABLET ORAL at 08:10

## 2025-04-15 RX ADMIN — INSULIN LISPRO 6 UNITS: 100 INJECTION, SOLUTION INTRAVENOUS; SUBCUTANEOUS at 08:10

## 2025-04-15 NOTE — CASE MANAGEMENT
"   Case Management Progress Note    Patient name Arron Cisse  Location /-01 MRN [id number]  : 1975 Date 4/15/2025       LOS (days): 0  Geometric Mean LOS (GMLOS) (days):   Days to GMLOS:        OBJECTIVE:        Current admission status: Observation  Preferred Pharmacy:   [id number] - [facility] [place] - [address]  [address]  [facility] [place] [address]  Phone: [phone] Fax: [phone]    Primary Care Provider: No primary care provider on file.    Primary Insurance: [employer]  Secondary Insurance:     PROGRESS NOTE:  CM received consult for \"medications.\"  Patient reviewed during SLIM rounds. It is unclear at this time whether he will dc on insulin or with oral medications to control his new onset diabetes.  Endocrinology consult is pending at this time and their recommendations and input are appreciated. If requiring insulin at dc, CM Dept can check the cost of the medication and supplies once sent to his pharmacy.  Otherwise, patient has an AMPAC score of 24 and doesn't appear to have any other CM needs.  CM Dept will remain available and will continue to follow.        "

## 2025-04-15 NOTE — PROGRESS NOTES
"Progress Note - Hospitalist   Name: Arron Cisse 49 y.o. male I MRN: [id number]  Unit/Bed#: -01 I Date of Admission: 4/14/2025   Date of Service: 4/15/2025 I Hospital Day: 0    Assessment & Plan  Diabetes mellitus, new onset (HCC)  No results found for: \"HGBA1C\"    Recent Labs     04/14/25  2030 04/14/25  2249 04/15/25  0736 04/15/25  1154   POCGLU 341* 275* 268* 279*   Blood Sugar Average: Last 72 hrs:  (P) 292.6  Patient presenting to the ED for sinus pressure, post nasal drip and \"throat was tight\" x4 days. Additionally endorsing symptoms of polyuria, polydipsia over the last couple weeks.  Glucose 347 on BMP, normal anion gap.  UA with trace ketones, elevated glucose, no protein present.   Lipid panel unremarkable.   Received 1L IVF bolus on admission.   Hgba1c pending  C/s endocrine and d/c with insulin regimen if over 9  Oral antihyperglycemics if under 9  Start sliding scale insulin while admitted  Pt states he is going to reach out to old PCP to schedule f/u appt after discharge  Diabetes education, establish PCP upon discharge  Elevated blood pressure reading  /87 on arrival. Not on antihypertensives at home.  Started on lisinopril 5mg and amlodipine 5mg daily   BP stable since.   Set up with PCP outpatient for further monitoring  BMI 50.0-59.9, adult (HCC)  Encourage healthy lifestyle modifications.    VTE Pharmacologic Prophylaxis: VTE Score: 4 Moderate Risk (Score 3-4) - Pharmacological DVT Prophylaxis Ordered: enoxaparin (Lovenox).    Mobility:   Basic Mobility Inpatient Raw Score: 24  JH-HLM Goal: 8: Walk 250 feet or more  JH-HLM Achieved: 7: Walk 25 feet or more  JH-HLM Goal achieved. Continue to encourage appropriate mobility.    Patient Centered Rounds: I performed bedside rounds with nursing staff today.   Discussions with Specialists or Other Care Team Provider: Endocrinology    Education and Discussions with Family / Patient: Patient declined call to .     Current " Length of Stay: 0 day(s)  Current Patient Status: Observation   Certification Statement:  Patient discharge as obs, pending endocrinology c/s and A1c level later today.  Discharge Plan: Anticipate discharge later today or tomorrow to home.    Code Status: Level 1 - Full Code    Subjective   Patient seen at bedside, states he feels anxious about new diagnosis. Thorough discussion regarding diet, exercise, medication management, PCP follow-up. Denies CP, SOB, abd pain, N/V/D/C.     Objective :  Temp:  [97.8 °F (36.6 °C)-98.4 °F (36.9 °C)] 98.2 °F (36.8 °C)  HR:  [] 101  BP: (120-171)/(85-89) 120/85  Resp:  [16-20] 19  SpO2:  [94 %-99 %] 98 %  O2 Device: None (Room air)    Body mass index is 59.28 kg/m².     Input and Output Summary (last 24 hours):     Intake/Output Summary (Last 24 hours) at 4/15/2025 1314  Last data filed at 4/15/2025 0101  Gross per 24 hour   Intake 1120 ml   Output 350 ml   Net 770 ml       Physical Exam  Constitutional:       General: He is not in acute distress.     Appearance: He is obese. He is not ill-appearing, toxic-appearing or diaphoretic.   HENT:      Head: Normocephalic and atraumatic.      Nose: Nose normal.      Mouth/Throat:      Pharynx: Oropharynx is clear.   Eyes:      Conjunctiva/sclera: Conjunctivae normal.   Cardiovascular:      Rate and Rhythm: Normal rate and regular rhythm.      Heart sounds: No murmur heard.     No friction rub. No gallop.   Pulmonary:      Effort: Pulmonary effort is normal. No respiratory distress.      Breath sounds: Normal breath sounds. No stridor. No wheezing, rhonchi or rales.   Abdominal:      General: Abdomen is flat. There is no distension.      Palpations: Abdomen is soft.      Tenderness: There is no abdominal tenderness. There is no guarding.   Musculoskeletal:         General: No swelling, tenderness, deformity or signs of injury. Normal range of motion.      Cervical back: Normal range of motion.   Skin:     General: Skin is warm and  dry.      Coloration: Skin is not jaundiced or pale.   Neurological:      Mental Status: He is alert and oriented to person, place, and time. Mental status is at baseline.   Psychiatric:         Mood and Affect: Mood normal.         Behavior: Behavior normal.     Lines/Drains:      Lab Results: I have reviewed the following results:   Results from last 7 days   Lab Units 04/15/25  0737   WBC Thousand/uL 6.89   HEMOGLOBIN g/dL 15.1   HEMATOCRIT % 44.4   PLATELETS Thousands/uL 256   SEGS PCT % 57   LYMPHO PCT % 32   MONO PCT % 9   EOS PCT % 2     Results from last 7 days   Lab Units 04/15/25  0737   SODIUM mmol/L 138   POTASSIUM mmol/L 3.8   CHLORIDE mmol/L 103   CO2 mmol/L 26   BUN mg/dL 11   CREATININE mg/dL 0.90   ANION GAP mmol/L 9   CALCIUM mg/dL 9.5   GLUCOSE RANDOM mg/dL 275*         Results from last 7 days   Lab Units 04/15/25  1154 04/15/25  0736 04/14/25  2249 04/14/25  2030 04/14/25  1753   POC GLUCOSE mg/dl 279* 268* 275* 341* 300*               Recent Cultures (last 7 days):         Imaging Results Review: No pertinent imaging studies reviewed.  Other Study Results Review: No additional pertinent studies reviewed.    Last 24 Hours Medication List:     Current Facility-Administered Medications:     acetaminophen (TYLENOL) tablet 650 mg, Q6H PRN    amLODIPine (NORVASC) tablet 5 mg, Daily    enoxaparin (LOVENOX) subcutaneous injection 60 mg, BID    hydrOXYzine HCL (ATARAX) tablet 25 mg, Q6H PRN    insulin lispro (HumALOG/ADMELOG) 100 units/mL subcutaneous injection 1-5 Units, HS    insulin lispro (HumALOG/ADMELOG) 100 units/mL subcutaneous injection 2-12 Units, TID AC **AND** Fingerstick Glucose (POCT), TID AC    lisinopril (ZESTRIL) tablet 5 mg, Daily    ondansetron (ZOFRAN) injection 4 mg, Q6H PRN    Administrative Statements   Today, Patient Was Seen By: Meera Correia PA-C  I have spent a total time of 60 minutes in caring for this patient on the day of the visit/encounter including Diagnostic results,  Prognosis, Risks and benefits of tx options, Instructions for management, Patient and family education, Importance of tx compliance, Risk factor reductions, Impressions, Counseling / Coordination of care, Documenting in the medical record, Reviewing/placing orders in the medical record (including tests, medications, and/or procedures), Obtaining or reviewing history  , and Communicating with other healthcare professionals .    **Please Note: This note may have been constructed using a voice recognition system.**

## 2025-04-15 NOTE — ASSESSMENT & PLAN NOTE
"No results found for: \"HGBA1C\"    Recent Labs     04/14/25  2030 04/14/25  2249 04/15/25  0736 04/15/25  1154   POCGLU 341* 275* 268* 279*   Blood Sugar Average: Last 72 hrs:  (P) 292.6  Patient presenting to the ED for sinus pressure, post nasal drip and \"throat was tight\" x4 days. Additionally endorsing symptoms of polyuria, polydipsia over the last couple weeks.  Glucose 347 on BMP, normal anion gap.  UA with trace ketones, elevated glucose, no protein present.   Lipid panel unremarkable.   Received 1L IVF bolus on admission.   Hgba1c pending  C/s endocrine and d/c with insulin regimen if over 9  Oral antihyperglycemics if under 9  Start sliding scale insulin while admitted  Pt states he is going to reach out to old PCP to schedule f/u appt after discharge  Diabetes education, establish PCP upon discharge  "

## 2025-04-15 NOTE — PLAN OF CARE
Problem: PAIN - ADULT  Goal: Verbalizes/displays adequate comfort level or baseline comfort level  Description: Interventions:- Encourage patient to monitor pain and request assistance- Assess pain using appropriate pain scale- Administer analgesics based on type and severity of pain and evaluate response- Implement non-pharmacological measures as appropriate and evaluate response- Consider cultural and social influences on pain and pain management- Notify physician/advanced practitioner if interventions unsuccessful or patient reports new pain  Outcome: Progressing     Problem: INFECTION - ADULT  Goal: Absence or prevention of progression during hospitalization  Description: INTERVENTIONS:- Assess and monitor for signs and symptoms of infection- Monitor lab/diagnostic results- Monitor all insertion sites, i.e. indwelling lines, tubes, and drains- Monitor endotracheal if appropriate and nasal secretions for changes in amount and color- [place] appropriate cooling/warming therapies per order- Administer medications as ordered- Instruct and encourage patient and family to use good hand hygiene technique- Identify and instruct in appropriate isolation precautions for identified infection/condition  Outcome: Progressing  Goal: Absence of fever/infection during neutropenic period  Description: INTERVENTIONS:- Monitor WBC  Outcome: Progressing     Problem: SAFETY ADULT  Goal: Patient will remain free of falls  Description: INTERVENTIONS:- Educate patient/family on patient safety including physical limitations- Instruct patient to call for assistance with activity - Consult OT/PT to assist with strengthening/mobility - Keep Call bell within reach- Keep bed low and locked with side rails adjusted as appropriate- Keep care items and personal belongings within reach- Initiate and maintain comfort rounds- Make Fall Risk Sign visible to staff- Offer Toileting every 2 Hours, in advance of need- Initiate/Maintain alarm-  Obtain necessary fall risk management equipment: - Apply yellow socks and bracelet for high fall risk patients- Consider moving patient to room near nurses station  Outcome: Progressing  Goal: Maintain or return to baseline ADL function  Description: INTERVENTIONS:-  Assess patient's ability to carry out ADLs; assess patient's baseline for ADL function and identify physical deficits which impact ability to perform ADLs (bathing, care of mouth/teeth, toileting, grooming, dressing, etc.)- Assess/evaluate cause of self-care deficits - Assess range of motion- Assess patient's mobility; develop plan if impaired- Assess patient's need for assistive devices and provide as appropriate- Encourage maximum independence but intervene and supervise when necessary- Involve family in performance of ADLs- Assess for home care needs following discharge - Consider OT consult to assist with ADL evaluation and planning for discharge- Provide patient education as appropriate  Outcome: Progressing  Goal: Maintains/Returns to pre admission functional level  Description: INTERVENTIONS:- Perform AM-PAC 6 Click Basic Mobility/ Daily Activity assessment daily.- Set and communicate daily mobility goal to care team and patient/family/caregiver. - Collaborate with rehabilitation services on mobility goals if consulted- Perform Range of Motion  times a day.- Reposition patient every 2 hours.- Dangle patient 2 times a day- Stand patient 2 times a day- Ambulate patient 2 times a day- Out of bed to chair 2 times a day - Out of bed for meals 2 times a day- Out of bed for toileting- Record patient progress and toleration of activity level   Outcome: Progressing     Problem: DISCHARGE PLANNING  Goal: Discharge to home or other facility with appropriate resources  Description: INTERVENTIONS:- Identify barriers to discharge w/patient and caregiver- Arrange for needed discharge resources and transportation as appropriate- Identify discharge learning needs  (meds, wound care, etc.)- Arrange for interpretive services to assist at discharge as needed- Refer to Case Management Department for coordinating discharge planning if the patient needs post-hospital services based on physician/advanced practitioner order or complex needs related to functional status, cognitive ability, or social support system  Outcome: Progressing     Problem: Knowledge Deficit  Goal: Patient/family/caregiver demonstrates understanding of disease process, treatment plan, medications, and discharge instructions  Description: Complete learning assessment and assess knowledge base.Interventions:- Provide teaching at level of understanding- Provide teaching via preferred learning methods  Outcome: Progressing     Problem: Nutrition/Hydration-ADULT  Goal: Nutrient/Hydration intake appropriate for improving, restoring or maintaining nutritional needs  Description: Monitor and assess patient's nutrition/hydration status for malnutrition. Collaborate with interdisciplinary team and initiate plan and interventions as ordered.  Monitor patient's weight and dietary intake as ordered or per policy. Utilize nutrition screening tool and intervene as necessary. Determine patient's food preferences and provide high-protein, high-caloric foods as appropriate. INTERVENTIONS:- Monitor oral intake, urinary output, labs, and treatment plans- Assess nutrition and hydration status and recommend course of action- Evaluate amount of meals eaten- Assist patient with eating if necessary - Allow adequate time for meals- Recommend/ encourage appropriate diets, oral nutritional supplements, and vitamin/mineral supplements- Order, calculate, and assess calorie counts as needed- Recommend, monitor, and adjust tube feedings and TPN/PPN based on assessed needs- Assess need for intravenous fluids- Provide specific nutrition/hydration education as appropriate- Include patient/family/caregiver in decisions related to  nutrition  Outcome: Progressing

## 2025-04-15 NOTE — PLAN OF CARE
Problem: DISCHARGE PLANNING  Goal: Discharge to home or other facility with appropriate resources  Description: INTERVENTIONS:- Identify barriers to discharge w/patient and caregiver- Arrange for needed discharge resources and transportation as appropriate- Identify discharge learning needs (meds, wound care, etc.)- Arrange for interpretive services to assist at discharge as needed- Refer to Case Management Department for coordinating discharge planning if the patient needs post-hospital services based on physician/advanced practitioner order or complex needs related to functional status, cognitive ability, or social support system  Outcome: Progressing     Problem: Knowledge Deficit  Goal: Patient/family/caregiver demonstrates understanding of disease process, treatment plan, medications, and discharge instructions  Description: Complete learning assessment and assess knowledge base.Interventions:- Provide teaching at level of understanding- Provide teaching via preferred learning methods  Outcome: Progressing     Problem: Nutrition/Hydration-ADULT  Goal: Nutrient/Hydration intake appropriate for improving, restoring or maintaining nutritional needs  Description: Monitor and assess patient's nutrition/hydration status for malnutrition. Collaborate with interdisciplinary team and initiate plan and interventions as ordered.  Monitor patient's weight and dietary intake as ordered or per policy. Utilize nutrition screening tool and intervene as necessary. Determine patient's food preferences and provide high-protein, high-caloric foods as appropriate. INTERVENTIONS:- Monitor oral intake, urinary output, labs, and treatment plans- Assess nutrition and hydration status and recommend course of action- Evaluate amount of meals eaten- Assist patient with eating if necessary - Allow adequate time for meals- Recommend/ encourage appropriate diets, oral nutritional supplements, and vitamin/mineral supplements- Order,  calculate, and assess calorie counts as needed- Recommend, monitor, and adjust tube feedings and TPN/PPN based on assessed needs- Assess need for intravenous fluids- Provide specific nutrition/hydration education as appropriate- Include patient/family/caregiver in decisions related to nutrition  Outcome: Progressing

## 2025-04-15 NOTE — CONSULTS
TeleConsultation - Endocrine  Arron Cisse 49 y.o. male MRN: [id number]   Encounter: [id number]      REQUIRED DOCUMENTATION:     1. This service was provided via Telemedicine.  2. Provider located at [address] [place] [address], [address], [place] .  3. TeleMed provider: Tiffanie Hall MD.  4. Identify all parties in room with patient during tele consult:  Patient was alone in the room.  5. After connecting through [employer], patient was identified by name and date of birth and assistant checked wristband.  Patient was then informed that this was a Telemedicine visit and that the exam was being conducted confidentially over secure lines. My office door was closed. No one else was in the room.  Patient acknowledged consent and understanding of privacy and security of the Telemedicine visit, and gave us permission to have the assistant stay in the room in order to assist with the history and to conduct the exam.  I informed the patient that I have reviewed their record in Epic and presented the opportunity for them to ask any questions regarding the visit today.  The patient agreed to participate.       Assessment & Plan     Assessment:  1.  New onset type 2 diabetes.  Hemoglobin A1c still pending but blood sugars running 200-300.  Likely will eventually be able to utilize oral agents and possibly GLP-1 inhibitors with dietary management.  Would recommend diabetes education as an outpatient.  Will start by adding metformin at suppertime and Lantus insulin at night.  Continue sliding scale Humalog for now.  Will need to learn insulin therapy.    2.  Hypertension.  Treatment per primary team.    3.  Obesity.  Will need to work on dietary changes with exercise and weight loss as an outpatient.  Consider GLP-1 inhibitor for diabetes care which may assist in the weight loss.  This can be done as an outpatient.    Plan:  1.  Start Lantus insulin 20 units at bedtime.    2.  Continue Humalog insulin sliding  "scale.    3.  Start metformin  mg at dinner.    4.  Continue 4 times daily blood sugar testing.    5.  Will need insulin administration instruction.    6.  Will need diabetes education as an outpatient.    7.  If blood sugars around 250 or less over the next 12 hours, could be discharged home on just insulin and metformin.    History of Present Illness     HPI: Arron Cisse is a 49 y.o. year old male who presents with new onset type 2 diabetes and hypertension.  Endocrine is asked to consult for diabetes management.    He reports that he has been just \"not feeling himself\" recently and sent himself to the ED as he thought he was getting a cold or an illness as he had postnasal drip.  He was told in the ER that he has elevated blood sugars and diabetes and had hypertension.  He does admit to some polyuria and thirst along with hunger and nocturia.  He denies fatigue or weight loss.  He denies blurry vision or extremity paresthesias.  He does report that he was eating poorly.  He also reports that there is a family history of diabetes in his father and paternal grandmother along with maternal aunt and maternal grandmother.    He was admitted with hyperglycemia and new onset diabetes though was not in DKA.  He was initiated on sliding scale insulin coverage and blood sugars have decreased from the 300s to the 200s so far.    Inpatient consult to Endocrinology  Consult performed by: Tiffanie Hall MD  Consult ordered by: Reginald Robertson MD          Review of Systems   Constitutional:  Negative for fatigue and unexpected weight change.   HENT:  Negative for trouble swallowing.    Eyes:  Negative for visual disturbance.   Respiratory:  Negative for chest tightness and shortness of breath.    Cardiovascular:  Negative for chest pain.   Gastrointestinal:  Negative for abdominal pain, constipation, diarrhea and nausea.   Endocrine: Positive for polydipsia, polyphagia and polyuria.        Has nocturia.    Skin:  " "Negative for wound.   Neurological:  Negative for tremors, numbness and headaches.   Psychiatric/Behavioral:  Negative for sleep disturbance.        Historical Information   Past Medical History:   Diagnosis Date    Gout      History reviewed. No pertinent surgical history.  Social History   Social History     Substance and Sexual Activity   Alcohol Use Yes    Comment: occ     Social History     Substance and Sexual Activity   Drug Use No     Social History     Tobacco Use   Smoking Status Never   Smokeless Tobacco Never     Family History: Family history non-contributory    Meds/Allergies   all current active meds have been reviewed  Allergies   Allergen Reactions    Eggs Or Egg-Derived Products - Food Allergy Hives    Milk-Related Compounds - Food Allergy GI Intolerance    Shellfish-Derived Products - Food Allergy Hives       Objective   Vitals: Blood pressure 120/85, pulse 101, temperature 98.2 °F (36.8 °C), temperature source Oral, resp. rate 19, height 5' 11\" (1.803 m), weight (!) 193 kg (425 lb), SpO2 98%.    Intake/Output Summary (Last 24 hours) at 4/15/2025 1716  Last data filed at 4/15/2025 0101  Gross per 24 hour   Intake 1120 ml   Output 350 ml   Net 770 ml     Invasive Devices       Peripheral Intravenous Line  Duration             Peripheral IV 04/14/25 Proximal;Right;Ventral (anterior) Forearm 1 day                    Physical Exam    Physical Exam   Constitutional: He is oriented to person, place, and time. He appears well-developed and well-nourished. No distress.   HENT: No obvious lid lag stare proptosis or periorbital edema  Head: Normocephalic and atraumatic.   Neck: Normal range of motion.  No neck enlargement.  Pulmonary/Chest: Effort normal.  No audible wheezing.  Musculoskeletal: Normal range of motion.   Neurological: He is alert and oriented to person, place, and time.   Skin: He is not diaphoretic.   Psychiatric: He has a normal mood and affect. His behavior is normal.     Lab Results: "     Blood work performed on admission on 4/14/2025 showed a BMP with a glucose of 347, anion gap of 9, sodium 137, potassium 4, CO2 27, chloride 101, BUN 13, creatinine 0.96.    Hemoglobin A1c is still pending.  Imaging Studies: Results Review Statement: No pertinent imaging studies reviewed.  EKG, Pathology, and Other Studies: Results Review Statement: No pertinent imaging studies reviewed.      Code Status: Level 1 - Full Code  Advance Directive and Living Will:      Power of :    POLST:      Counseling / Coordination of Care  Total floor / unit time spent today 30 minutes. Greater than 50% of total time was spent with the patient and / or family counseling and / or coordination of care. A description of the counseling / coordination of care: Patient was educated regarding basic dietary changes with carbohydrate restriction and making sure to eat protein with every meal.  Patient was educated that he will need insulin therapy and learn to administer insulin and be discharged on insulin therapy.  He will also need to follow-up with diabetes education for medical nutrition therapy as an outpatient.

## 2025-04-15 NOTE — ASSESSMENT & PLAN NOTE
/87 on arrival. Not on antihypertensives at home.  Started on lisinopril 5mg and amlodipine 5mg daily   BP stable since.   Set up with PCP outpatient for further monitoring

## 2025-04-15 NOTE — UTILIZATION REVIEW
" Initial Clinical Review    Admission: Date/Time/Statement:   Admission Orders (From admission, onward)       Ordered        04/14/25 1726  Place in Observation  Once                          Orders Placed This Encounter   Procedures    Place in Observation     Standing Status:   Standing     Number of Occurrences:   1     Level of Care:   Med Surg [16]     ED Arrival Information       Expected   -    Arrival   4/14/2025 15:18    Acuity   Urgent              Means of arrival   Walk-In    Escorted by   Self    Service   Hospitalist    Admission type   Emergency              Arrival complaint   Flu Like Symptoms             Chief Complaint   Patient presents with    Allergies     Pt reports 2 days ago he started feeling \"like my throat was tight\" Pt took benadryl, pt also reports sinus pressure and post nasal drip.        Initial Presentation: 49 y.o. male to the ED from home with complaints of sinus pressure, post nasal drip, tight throat.  Arrives tachycardic, elevated bp. Arrives with glucose 300 .  Admitted under observation for new onset DM.  H/O gout. Took benadryl at home.  In the Ed, given 1 liter iv fluids.  Continue iv fluids.  Endocrine consult.  Check Lipid panel.       Date: 4/15/   Day 2:      ED Treatment-Medication Administration from 04/14/2025 1517 to 04/14/2025 2012         Date/Time Order Dose Route Action     04/14/2025 1705 sodium chloride 0.9 % bolus 1,000 mL 1,000 mL Intravenous New Bag     04/14/2025 1812 insulin lispro (HumALOG/ADMELOG) 100 units/mL subcutaneous injection 2-12 Units 8 Units Subcutaneous Given     04/14/2025 1820 amLODIPine (NORVASC) tablet 5 mg 5 mg Oral Given     04/14/2025 1820 lisinopril (ZESTRIL) tablet 5 mg 5 mg Oral Given            Scheduled Medications:  amLODIPine, 5 mg, Oral, Daily  enoxaparin, 60 mg, Subcutaneous, BID  insulin lispro, 1-5 Units, Subcutaneous, HS  insulin lispro, 2-12 Units, Subcutaneous, TID AC  lisinopril, 5 mg, Oral, Daily      Continuous IV " Infusions:     PRN Meds:  acetaminophen, 650 mg, Oral, Q6H PRN  hydrOXYzine HCL, 25 mg, Oral, Q6H PRN  ondansetron, 4 mg, Intravenous, Q6H PRN      ED Triage Vitals   Temperature Pulse Respirations Blood Pressure SpO2 Pain Score   04/14/25 1525 04/14/25 1525 04/14/25 1525 04/14/25 1525 04/14/25 1525 04/14/25 2022   97.8 °F (36.6 °C) 104 20 (!) 171/87 99 % No Pain     Weight (last 2 days)       Date/Time Weight    04/14/25 20:22:06 193 (425)    04/14/25 1524 193 (425.93)            Vital Signs (last 3 days)       Date/Time Temp Pulse Resp BP MAP (mmHg) SpO2 O2 Device Patient Position - Orthostatic VS Pain    04/15/25 07:24:18 98.2 °F (36.8 °C) 101 19 120/85 97 98 % None (Room air) Sitting No Pain    04/15/25 0303 -- 82 -- -- -- 94 % -- -- --    04/14/25 2324 -- 91 -- -- -- 95 % -- -- --    04/14/25 22:54:39 -- 107 18 120/86 97 96 % None (Room air) Lying No Pain    04/14/25 2135 -- 93 -- -- -- 97 % -- -- --    04/14/25 20:22:06 98.4 °F (36.9 °C) 107 18 131/89 103 97 % None (Room air) Lying No Pain    04/14/25 1800 -- 102 16 160/89 118 96 % None (Room air) Lying --    04/14/25 1525 97.8 °F (36.6 °C) 104 20 171/87 120 99 % None (Room air) Sitting --              Pertinent Labs/Diagnostic Test Results:   Radiology:  No orders to display     Cardiology:  ECG 12 lead    by Interface, Ris Results In (04/14 2308)        GI:  No orders to display           Results from last 7 days   Lab Units 04/15/25  0737 04/14/25  1607   WBC Thousand/uL 6.89 8.11   HEMOGLOBIN g/dL 15.1 14.8   HEMATOCRIT % 44.4 44.5   PLATELETS Thousands/uL 256 259   TOTAL NEUT ABS Thousands/µL 3.89 5.50         Results from last 7 days   Lab Units 04/15/25  0737 04/14/25  1607   SODIUM mmol/L 138 137   POTASSIUM mmol/L 3.8 4.0   CHLORIDE mmol/L 103 101   CO2 mmol/L 26 27   ANION GAP mmol/L 9 9   BUN mg/dL 11 13   CREATININE mg/dL 0.90 0.96   EGFR ml/min/1.73sq m 101 93   CALCIUM mg/dL 9.5 9.3         Results from last 7 days   Lab Units 04/15/25  0736  "04/14/25  2249 04/14/25  2030 04/14/25  1753   POC GLUCOSE mg/dl 268* 275* 341* 300*     Results from last 7 days   Lab Units 04/15/25  0737 04/14/25  1607   GLUCOSE RANDOM mg/dL 275* 347*             No results found for: \"BETA-HYDROXYBUTYRATE\"                                                                                       Results from last 7 days   Lab Units 04/14/25  1707   CLARITY UA  Clear   COLOR UA  Light Yellow   SPEC GRAV UA  1.022   PH UA  5.5   GLUCOSE UA mg/dl >=1000 (1%)*   KETONES UA mg/dl Trace*   BLOOD UA  Negative   PROTEIN UA mg/dl Negative   NITRITE UA  Negative   BILIRUBIN UA  Negative   UROBILINOGEN UA (BE) mg/dl <2.0   LEUKOCYTES UA  Elevated glucose may cause decreased leukocyte values. See urine microscopic for UWBC result*   WBC UA /hpf 2-4*   RBC UA /hpf None Seen   BACTERIA UA /hpf None Seen   EPITHELIAL CELLS WET PREP /hpf Occasional         Past Medical History:   Diagnosis Date    Gout      Present on Admission:  **None**      Admitting Diagnosis: Hyperglycemia [R73.9]  Diabetes mellitus, new onset (HCC) [E11.9]  Flu-like symptoms [R68.89]  Age/Sex: 49 y.o. male    Network Utilization Review Department  ATTENTION: Please call with any questions or concerns to [phone] and carefully listen to the prompts so that you are directed to the right person. All voicemails are confidential.   For Discharge needs, contact Care Management DC Support Team at [phone] opt. [phone]  Send all requests for admission clinical reviews, approved or denied determinations and any other requests to dedicated fax number below belonging to the campus where the patient is receiving treatment. List of dedicated fax numbers for the Facilities:  FACILITY NAME UR FAX NUMBER   ADMISSION DENIALS (Administrative/Medical Necessity) [phone]   DISCHARGE SUPPORT TEAM (NETWORK) [phone]   PARENT CHILD HEALTH (Maternity/NICU/Pediatrics) [phone]   [facility] [phone]   [facility] " [facility] [phone]   [facility] [phone]   [facility] [phone]   [facility] [phone]   [facility] [phone]   [facility] [phone]   [facility] [phone]   [facility] [phone]   [facility] [phone]   [facility] [phone]   [facility] [phone]

## 2025-04-16 VITALS
DIASTOLIC BLOOD PRESSURE: 78 MMHG | SYSTOLIC BLOOD PRESSURE: 126 MMHG | WEIGHT: 315 LBS | TEMPERATURE: 98.2 F | HEIGHT: 71 IN | OXYGEN SATURATION: 97 % | BODY MASS INDEX: 44.1 KG/M2 | HEART RATE: 93 BPM | RESPIRATION RATE: 20 BRPM

## 2025-04-16 LAB
EST. AVERAGE GLUCOSE BLD GHB EST-MCNC: 367 MG/DL
GLUCOSE SERPL-MCNC: 236 MG/DL (ref 65–140)
HBA1C MFR BLD: 14.4 %

## 2025-04-16 PROCEDURE — 82948 REAGENT STRIP/BLOOD GLUCOSE: CPT

## 2025-04-16 PROCEDURE — 99239 HOSP IP/OBS DSCHRG MGMT >30: CPT | Performed by: PHYSICIAN ASSISTANT

## 2025-04-16 RX ORDER — INSULIN GLARGINE 100 [IU]/ML
20 INJECTION, SOLUTION SUBCUTANEOUS
Qty: 10 ML | Refills: 0 | Status: SHIPPED | OUTPATIENT
Start: 2025-04-16

## 2025-04-16 RX ORDER — METFORMIN HYDROCHLORIDE 500 MG/1
500 TABLET, EXTENDED RELEASE ORAL
Qty: 30 TABLET | Refills: 0 | Status: SHIPPED | OUTPATIENT
Start: 2025-04-16

## 2025-04-16 RX ORDER — LANCETS 28 GAUGE
EACH MISCELLANEOUS
Qty: 100 EACH | Refills: 0 | Status: SHIPPED | OUTPATIENT
Start: 2025-04-16

## 2025-04-16 RX ORDER — AMLODIPINE BESYLATE 5 MG/1
5 TABLET ORAL DAILY
Qty: 30 TABLET | Refills: 0 | Status: SHIPPED | OUTPATIENT
Start: 2025-04-17

## 2025-04-16 RX ORDER — LISINOPRIL 5 MG/1
5 TABLET ORAL DAILY
Qty: 30 TABLET | Refills: 0 | Status: SHIPPED | OUTPATIENT
Start: 2025-04-17

## 2025-04-16 RX ADMIN — LISINOPRIL 5 MG: 5 TABLET ORAL at 08:18

## 2025-04-16 RX ADMIN — AMLODIPINE BESYLATE 5 MG: 5 TABLET ORAL at 08:18

## 2025-04-16 RX ADMIN — INSULIN LISPRO 4 UNITS: 100 INJECTION, SOLUTION INTRAVENOUS; SUBCUTANEOUS at 08:17

## 2025-04-16 NOTE — PLAN OF CARE
Problem: Knowledge Deficit  Goal: Patient/family/caregiver demonstrates understanding of disease process, treatment plan, medications, and discharge instructions  Description: Complete learning assessment and assess knowledge base.Interventions:- Provide teaching at level of understanding- Provide teaching via preferred learning methods  Outcome: Progressing     Problem: DISCHARGE PLANNING  Goal: Discharge to home or other facility with appropriate resources  Description: INTERVENTIONS:- Identify barriers to discharge w/patient and caregiver- Arrange for needed discharge resources and transportation as appropriate- Identify discharge learning needs (meds, wound care, etc.)- Arrange for interpretive services to assist at discharge as needed- Refer to Case Management Department for coordinating discharge planning if the patient needs post-hospital services based on physician/advanced practitioner order or complex needs related to functional status, cognitive ability, or social support system  Outcome: Progressing     Problem: Nutrition/Hydration-ADULT  Goal: Nutrient/Hydration intake appropriate for improving, restoring or maintaining nutritional needs  Description: Monitor and assess patient's nutrition/hydration status for malnutrition. Collaborate with interdisciplinary team and initiate plan and interventions as ordered.  Monitor patient's weight and dietary intake as ordered or per policy. Utilize nutrition screening tool and intervene as necessary. Determine patient's food preferences and provide high-protein, high-caloric foods as appropriate. INTERVENTIONS:- Monitor oral intake, urinary output, labs, and treatment plans- Assess nutrition and hydration status and recommend course of action- Evaluate amount of meals eaten- Assist patient with eating if necessary - Allow adequate time for meals- Recommend/ encourage appropriate diets, oral nutritional supplements, and vitamin/mineral supplements- Order,  calculate, and assess calorie counts as needed- Recommend, monitor, and adjust tube feedings and TPN/PPN based on assessed needs- Assess need for intravenous fluids- Provide specific nutrition/hydration education as appropriate- Include patient/family/caregiver in decisions related to nutrition  Outcome: Progressing

## 2025-04-16 NOTE — PLAN OF CARE
Problem: PAIN - ADULT  Goal: Verbalizes/displays adequate comfort level or baseline comfort level  Description: Interventions:- Encourage patient to monitor pain and request assistance- Assess pain using appropriate pain scale- Administer analgesics based on type and severity of pain and evaluate response- Implement non-pharmacological measures as appropriate and evaluate response- Consider cultural and social influences on pain and pain management- Notify physician/advanced practitioner if interventions unsuccessful or patient reports new pain  Outcome: Adequate for Discharge     Problem: INFECTION - ADULT  Goal: Absence or prevention of progression during hospitalization  Description: INTERVENTIONS:- Assess and monitor for signs and symptoms of infection- Monitor lab/diagnostic results- Monitor all insertion sites, i.e. indwelling lines, tubes, and drains- Monitor endotracheal if appropriate and nasal secretions for changes in amount and color- [place] appropriate cooling/warming therapies per order- Administer medications as ordered- Instruct and encourage patient and family to use good hand hygiene technique- Identify and instruct in appropriate isolation precautions for identified infection/condition  Outcome: Adequate for Discharge  Goal: Absence of fever/infection during neutropenic period  Description: INTERVENTIONS:- Monitor WBC  Outcome: Adequate for Discharge     Problem: SAFETY ADULT  Goal: Patient will remain free of falls  Description: INTERVENTIONS:- Educate patient/family on patient safety including physical limitations- Instruct patient to call for assistance with activity - Consult OT/PT to assist with strengthening/mobility - Keep Call bell within reach- Keep bed low and locked with side rails adjusted as appropriate- Keep care items and personal belongings within reach- Initiate and maintain comfort rounds- Make Fall Risk Sign visible to staff- Offer Toileting every 2 Hours, in advance of  need- Initiate/Maintain bed alarm- Obtain necessary fall risk management equipment: - Apply yellow socks and bracelet for high fall risk patients- Consider moving patient to room near nurses station  Outcome: Adequate for Discharge  Goal: Maintain or return to baseline ADL function  Description: INTERVENTIONS:-  Assess patient's ability to carry out ADLs; assess patient's baseline for ADL function and identify physical deficits which impact ability to perform ADLs (bathing, care of mouth/teeth, toileting, grooming, dressing, etc.)- Assess/evaluate cause of self-care deficits - Assess range of motion- Assess patient's mobility; develop plan if impaired- Assess patient's need for assistive devices and provide as appropriate- Encourage maximum independence but intervene and supervise when necessary- Involve family in performance of ADLs- Assess for home care needs following discharge - Consider OT consult to assist with ADL evaluation and planning for discharge- Provide patient education as appropriate  Outcome: Adequate for Discharge  Goal: Maintains/Returns to pre admission functional level  Description: INTERVENTIONS:- Perform AM-PAC 6 Click Basic Mobility/ Daily Activity assessment daily.- Set and communicate daily mobility goal to care team and patient/family/caregiver. - Collaborate with rehabilitation services on mobility goals if consulted- Perform Range of Motion 2 times a day.- Reposition patient every 2 hours.- Dangle patient 2 times a day- Stand patient 2 times a day- Ambulate patient 2 times a day- Out of bed to chair 2 times a day - Out of bed for meals 2 times a day- Out of bed for toileting- Record patient progress and toleration of activity level   Outcome: Adequate for Discharge     Problem: DISCHARGE PLANNING  Goal: Discharge to home or other facility with appropriate resources  Description: INTERVENTIONS:- Identify barriers to discharge w/patient and caregiver- Arrange for needed discharge resources  and transportation as appropriate- Identify discharge learning needs (meds, wound care, etc.)- Arrange for interpretive services to assist at discharge as needed- Refer to Case Management Department for coordinating discharge planning if the patient needs post-hospital services based on physician/advanced practitioner order or complex needs related to functional status, cognitive ability, or social support system  Outcome: Adequate for Discharge     Problem: Knowledge Deficit  Goal: Patient/family/caregiver demonstrates understanding of disease process, treatment plan, medications, and discharge instructions  Description: Complete learning assessment and assess knowledge base.Interventions:- Provide teaching at level of understanding- Provide teaching via preferred learning methods  Outcome: Adequate for Discharge     Problem: Nutrition/Hydration-ADULT  Goal: Nutrient/Hydration intake appropriate for improving, restoring or maintaining nutritional needs  Description: Monitor and assess patient's nutrition/hydration status for malnutrition. Collaborate with interdisciplinary team and initiate plan and interventions as ordered.  Monitor patient's weight and dietary intake as ordered or per policy. Utilize nutrition screening tool and intervene as necessary. Determine patient's food preferences and provide high-protein, high-caloric foods as appropriate. INTERVENTIONS:- Monitor oral intake, urinary output, labs, and treatment plans- Assess nutrition and hydration status and recommend course of action- Evaluate amount of meals eaten- Assist patient with eating if necessary - Allow adequate time for meals- Recommend/ encourage appropriate diets, oral nutritional supplements, and vitamin/mineral supplements- Order, calculate, and assess calorie counts as needed- Recommend, monitor, and adjust tube feedings and TPN/PPN based on assessed needs- Assess need for intravenous fluids- Provide specific nutrition/hydration  education as appropriate- Include patient/family/caregiver in decisions related to nutrition  Outcome: Adequate for Discharge

## 2025-04-16 NOTE — ASSESSMENT & PLAN NOTE
Encourage healthy lifestyle modifications.  Potential for GLP1 start outpatient, recommend discussion with PCP.

## 2025-04-16 NOTE — ASSESSMENT & PLAN NOTE
"No results found for: \"HGBA1C\"    Recent Labs     04/15/25  1154 04/15/25  1632 04/15/25  2113 04/16/25  0807   POCGLU 279* 237* 211* 236*   Blood Sugar Average: Last 72 hrs:  (P) 268.375  Patient presenting to the ED for sinus pressure, post nasal drip and \"throat was tight\" x4 days. Additionally endorsing symptoms of polyuria, polydipsia over the last couple weeks.  Glucose 347 on BMP, normal anion gap.  UA with trace ketones, elevated glucose, no protein present.   Lipid panel unremarkable.   Received 1L IVF bolus on admission.   Hgba1c still pending  Endocrine c/s, appreciate recs  Will discharge home with metformin and 20u Lantus basal insulin QHS  Pt has f/u appt with [facility] PCP next week  Diabetes education, establish PCP upon discharge  "

## 2025-04-16 NOTE — DISCHARGE SUMMARY
"Discharge Summary - Hospitalist   Name: Arron Cisse 49 y.o. male I MRN: [id number]  Unit/Bed#: -01 I Date of Admission: 4/14/2025   Date of Service: 4/16/2025 I Hospital Day: 0     Assessment & Plan  Diabetes mellitus, new onset (HCC)  No results found for: \"HGBA1C\"    Recent Labs     04/15/25  1154 04/15/25  1632 04/15/25  2113 04/16/25  0807   POCGLU 279* 237* 211* 236*   Blood Sugar Average: Last 72 hrs:  (P) 268.375  Patient presenting to the ED for sinus pressure, post nasal drip and \"throat was tight\" x4 days. Additionally endorsing symptoms of polyuria, polydipsia over the last couple weeks.  Glucose 347 on BMP, normal anion gap.  UA with trace ketones, elevated glucose, no protein present.   Lipid panel unremarkable.   Received 1L IVF bolus on admission.   Hgba1c still pending  Endocrine c/s, appreciate recs  Will discharge home with metformin and 20u Lantus basal insulin QHS  Pt has f/u appt with [facility] PCP next week  Diabetes education, establish PCP upon discharge  Elevated blood pressure reading  /87 on arrival. Not on antihypertensives at home.  Started on lisinopril 5mg and amlodipine 5mg daily   BP stable since.   Set up with PCP outpatient for further monitoring  BMI 50.0-59.9, adult (HCC)  Encourage healthy lifestyle modifications.  Potential for GLP1 start outpatient, recommend discussion with PCP.      Medical Problems       Resolved Problems  Date Reviewed: 4/15/2025   None       Discharging Physician / Practitioner: Meera Correia PA-C  PCP: No primary care provider on file.  Admission Date:   Admission Orders (From admission, onward)       Ordered        04/14/25 1726  Place in Observation  Once                          Discharge Date: 04/16/25    Consultations During Hospital Stay:  Endocrinology    Procedures Performed:   N/A    Significant Findings / Test Results:   POC glucose on admission - 341  UA - >100glucose, trace ketones, negative protein    Incidental Findings: " "  N/A    Test Results Pending at Discharge (will require follow up):   HbA1c     Outpatient Tests Requested:  N/A    Complications:  N/A    Reason for Admission: New-onset T2DM    Hospital Course:   Arron Cisse is a 49 y.o. male patient who originally presented to the hospital on 4/14/2025 due to sinus pressure, postnasal drip, allergy-related throat closing. On admission, POC glucose and BMP found new-onset diabetes mellitus. Reported polyuria and polydipsia for past several weeks. Family history of diabetes in father.     Patient also hypertensive on admission, does not regularly check his BP or take antihypertensives. Started on 5mg amlodipine QD and 5mg lisinopril QD. BP have been controlled and stable since starting meds, continue these on discharge.     Consulted endocrinology as patient lacked PCP care on admission. Recommended sliding scale, Lantus 20u, metformin while admitted. Can discharge home with Lantus bedtime and metformin. Per patient, he has already scheduled an appointment with old PCP for follow-up. A1c still pending at time of discharge, patient aware to inform his PCP for result f/u.     Please see above list of diagnoses and related plan for additional information.     Condition at Discharge: good    Discharge Day Visit / Exam:   Subjective:  Patient seen sitting in chair waiting for breakfast. States he is less anxious since yesterday now that he has a PCP appointment in place and talked with endocrine last night. Wants to go home. Thorough diabetes education and return precautions discussed with patient. He verbalizes understanding. Denies any acute complaints.     Vitals: Blood Pressure: 126/78 (04/16/25 0810)  Pulse: 93 (04/16/25 0810)  Temperature: 98.2 °F (36.8 °C) (04/16/25 0810)  Temp Source: Oral (04/16/25 0810)  Respirations: 20 (04/16/25 0810)  Height: 5' 11\" (180.3 cm) (04/14/25 2022)  Weight - Scale: (!) 193 kg (425 lb) (04/14/25 2022)  SpO2: 97 % (04/16/25 0810)  Physical " Exam  Constitutional:       General: He is not in acute distress.     Appearance: He is not ill-appearing, toxic-appearing or diaphoretic.   HENT:      Head: Normocephalic and atraumatic.      Nose: Nose normal.      Mouth/Throat:      Pharynx: Oropharynx is clear.   Eyes:      Conjunctiva/sclera: Conjunctivae normal.   Cardiovascular:      Rate and Rhythm: Normal rate and regular rhythm.      Heart sounds: No murmur heard.     No friction rub. No gallop.   Pulmonary:      Effort: Pulmonary effort is normal. No respiratory distress.      Breath sounds: Normal breath sounds. No stridor. No wheezing, rhonchi or rales.   Abdominal:      General: Abdomen is flat. There is no distension.      Palpations: Abdomen is soft.      Tenderness: There is no abdominal tenderness. There is no guarding.   Musculoskeletal:         General: No swelling, tenderness, deformity or signs of injury. Normal range of motion.      Cervical back: Normal range of motion.   Skin:     General: Skin is warm and dry.      Coloration: Skin is not jaundiced or pale.   Neurological:      Mental Status: He is alert and oriented to person, place, and time. Mental status is at baseline.   Psychiatric:         Mood and Affect: Mood normal.         Behavior: Behavior normal.         Discussion with Family: Patient declined call to .     Discharge instructions/Information to patient and family:   See after visit summary for information provided to patient and family.      Provisions for Follow-Up Care:  See after visit summary for information related to follow-up care and any pertinent home health orders.      Mobility at time of Discharge:   Basic Mobility Inpatient Raw Score: 24  JH-HLM Goal: 8: Walk 250 feet or more  JH-HLM Achieved: 8: Walk 250 feet ot more  HLM Goal achieved. Continue to encourage appropriate mobility.     Disposition:   Home    Planned Readmission: N/A    Discharge Medications:  See after visit summary for reconciled  discharge medications provided to patient and/or family.      Administrative Statements   Discharge Statement:  I have spent a total time of 45 minutes in caring for this patient on the day of the visit/encounter. >30 minutes of time was spent on: Diagnostic results, Prognosis, Risks and benefits of tx options, Instructions for management, Patient and family education, Importance of tx compliance, Risk factor reductions, Impressions, Counseling / Coordination of care, Documenting in the medical record, Reviewing / ordering tests, medicine, procedures  , and Communicating with other healthcare professionals .    **Please Note: This note may have been constructed using a voice recognition system**

## 2025-06-01 ENCOUNTER — HOSPITAL ENCOUNTER (EMERGENCY)
Facility: HOSPITAL | Age: 50
Discharge: HOME | End: 2025-06-01
Attending: EMERGENCY MEDICINE | Admitting: EMERGENCY MEDICINE
Payer: COMMERCIAL

## 2025-06-01 VITALS
TEMPERATURE: 97.8 F | HEART RATE: 102 BPM | SYSTOLIC BLOOD PRESSURE: 147 MMHG | DIASTOLIC BLOOD PRESSURE: 67 MMHG | OXYGEN SATURATION: 99 % | RESPIRATION RATE: 20 BRPM

## 2025-06-01 DIAGNOSIS — F41.9 ANXIETY: Primary | ICD-10-CM

## 2025-06-01 LAB
ALBUMIN SERPL BCG-MCNC: 4.2 G/DL (ref 3.5–5)
ALP SERPL-CCNC: 49 U/L (ref 34–104)
ALT SERPL W P-5'-P-CCNC: 15 U/L (ref 7–52)
ANION GAP SERPL CALCULATED.3IONS-SCNC: 12 MMOL/L (ref 4–13)
AST SERPL W P-5'-P-CCNC: 13 U/L (ref 13–39)
ATRIAL RATE: 100 BPM
ATRIAL RATE: 104 BPM
BASOPHILS # BLD AUTO: 0.04 THOUSANDS/ÂΜL (ref 0–0.1)
BASOPHILS NFR BLD AUTO: 1 % (ref 0–1)
BILIRUB SERPL-MCNC: 0.82 MG/DL (ref 0.2–1)
BUN SERPL-MCNC: 9 MG/DL (ref 5–25)
CALCIUM SERPL-MCNC: 9.1 MG/DL (ref 8.4–10.2)
CHLORIDE SERPL-SCNC: 105 MMOL/L (ref 96–108)
CO2 SERPL-SCNC: 22 MMOL/L (ref 21–32)
CREAT SERPL-MCNC: 0.9 MG/DL (ref 0.6–1.3)
EOSINOPHIL # BLD AUTO: 0.1 THOUSAND/ÂΜL (ref 0–0.61)
EOSINOPHIL NFR BLD AUTO: 1 % (ref 0–6)
ERYTHROCYTE [DISTWIDTH] IN BLOOD BY AUTOMATED COUNT: 13.9 % (ref 11.6–15.1)
GFR SERPL CREATININE-BSD FRML MDRD: 101 ML/MIN/1.73SQ M
GLUCOSE SERPL-MCNC: 128 MG/DL (ref 65–140)
HCT VFR BLD AUTO: 40 % (ref 36.5–49.3)
HGB BLD-MCNC: 13.3 G/DL (ref 12–17)
IMM GRANULOCYTES # BLD AUTO: 0.04 THOUSAND/UL (ref 0–0.2)
IMM GRANULOCYTES NFR BLD AUTO: 1 % (ref 0–2)
LYMPHOCYTES # BLD AUTO: 1.47 THOUSANDS/ÂΜL (ref 0.6–4.47)
LYMPHOCYTES NFR BLD AUTO: 19 % (ref 14–44)
MCH RBC QN AUTO: 27.8 PG (ref 26.8–34.3)
MCHC RBC AUTO-ENTMCNC: 33.3 G/DL (ref 31.4–37.4)
MCV RBC AUTO: 84 FL (ref 82–98)
MONOCYTES # BLD AUTO: 0.64 THOUSAND/ÂΜL (ref 0.17–1.22)
MONOCYTES NFR BLD AUTO: 8 % (ref 4–12)
NEUTROPHILS # BLD AUTO: 5.63 THOUSANDS/ÂΜL (ref 1.85–7.62)
NEUTS SEG NFR BLD AUTO: 70 % (ref 43–75)
NRBC BLD AUTO-RTO: 0 /100 WBCS
P AXIS: 65 DEGREES
P AXIS: 66 DEGREES
PLATELET # BLD AUTO: 265 THOUSANDS/UL (ref 149–390)
PMV BLD AUTO: 9.9 FL (ref 8.9–12.7)
POTASSIUM SERPL-SCNC: 3.3 MMOL/L (ref 3.5–5.3)
PR INTERVAL: 164 MS
PR INTERVAL: 166 MS
PROT SERPL-MCNC: 7.7 G/DL (ref 6.4–8.4)
QRS AXIS: 34 DEGREES
QRS AXIS: 51 DEGREES
QRSD INTERVAL: 108 MS
QRSD INTERVAL: 108 MS
QT INTERVAL: 354 MS
QT INTERVAL: 360 MS
QTC INTERVAL: 464 MS
QTC INTERVAL: 465 MS
RBC # BLD AUTO: 4.78 MILLION/UL (ref 3.88–5.62)
SODIUM SERPL-SCNC: 139 MMOL/L (ref 135–147)
T WAVE AXIS: 14 DEGREES
T WAVE AXIS: 20 DEGREES
TSH SERPL DL<=0.05 MIU/L-ACNC: 2.69 UIU/ML (ref 0.45–4.5)
VENTRICULAR RATE: 100 BPM
VENTRICULAR RATE: 104 BPM
WBC # BLD AUTO: 7.92 THOUSAND/UL (ref 4.31–10.16)

## 2025-06-01 PROCEDURE — 93005 ELECTROCARDIOGRAM TRACING: CPT

## 2025-06-01 PROCEDURE — 99283 EMERGENCY DEPT VISIT LOW MDM: CPT

## 2025-06-01 PROCEDURE — 36415 COLL VENOUS BLD VENIPUNCTURE: CPT | Performed by: EMERGENCY MEDICINE

## 2025-06-01 PROCEDURE — 99285 EMERGENCY DEPT VISIT HI MDM: CPT | Performed by: EMERGENCY MEDICINE

## 2025-06-01 PROCEDURE — 84443 ASSAY THYROID STIM HORMONE: CPT | Performed by: EMERGENCY MEDICINE

## 2025-06-01 PROCEDURE — 80053 COMPREHEN METABOLIC PANEL: CPT | Performed by: EMERGENCY MEDICINE

## 2025-06-01 PROCEDURE — 93010 ELECTROCARDIOGRAM REPORT: CPT | Performed by: STUDENT IN AN ORGANIZED HEALTH CARE EDUCATION/TRAINING PROGRAM

## 2025-06-01 PROCEDURE — 85025 COMPLETE CBC W/AUTO DIFF WBC: CPT | Performed by: EMERGENCY MEDICINE

## 2025-06-01 RX ORDER — MAGNESIUM SULFATE HEPTAHYDRATE 40 MG/ML
2 INJECTION, SOLUTION INTRAVENOUS ONCE
Status: DISCONTINUED | OUTPATIENT
Start: 2025-06-01 | End: 2025-06-01

## 2025-06-01 RX ORDER — POTASSIUM CHLORIDE 1500 MG/1
40 TABLET, EXTENDED RELEASE ORAL ONCE
Status: COMPLETED | OUTPATIENT
Start: 2025-06-01 | End: 2025-06-01

## 2025-06-01 RX ADMIN — POTASSIUM CHLORIDE 40 MEQ: 1500 TABLET, EXTENDED RELEASE ORAL at 14:01

## 2025-06-01 NOTE — ED PROVIDER NOTES
ED Disposition       None          Assessment & Plan       Medical Decision Making  Patient is a 49-year-old male past medical history diabetes, hypertension presenting for anxiety.  Patient is well-appearing at bedside with stable vitals and in no acute distress though with low-grade tachycardia and low-grade hypertension.  He denies any sequelae of hypertensive emergency.  Will obtain labs due to new medication Mounjaro to assess for electrolyte abnormalities, anemia, MEE, have offered anxiolysis however patient has declined at this time we will continue to monitor.    Amount and/or Complexity of Data Reviewed  Labs: ordered.    Risk  Prescription drug management.           Medications - No data to display    ED Risk Strat Scores                    No data recorded        SBIRT 20yo+      Flowsheet Row Most Recent Value   Initial Alcohol Screen: US AUDIT-C     1. How often do you have a drink containing alcohol? 0 Filed at: 06/01/2025 1233   2. How many drinks containing alcohol do you have on a typical day you are drinking?  0 Filed at: 06/01/2025 1233   3a. Male UNDER 65: How often do you have five or more drinks on one occasion? 0 Filed at: 06/01/2025 1233   3b. FEMALE Any Age, or MALE 65+: How often do you have 4 or more drinks on one occassion? 0 Filed at: 06/01/2025 1233   Audit-C Score 0 Filed at: 06/01/2025 1233   ESVIN: How many times in the past year have you...    Used an illegal drug or used a prescription medication for non-medical reasons? Never Filed at: 06/01/2025 1233                            History of Present Illness       Chief Complaint   Patient presents with    Anxiety     Pt was at work and developed anxiety/dry mouth. Recent dx of diabetes.  for EMS.        Past Medical History[1]   Past Surgical History[2]   Family History[3]   Social History[4]   E-Cigarette/Vaping      E-Cigarette/Vaping Substances      I have reviewed and agree with the history as documented.     Patient is a  49-year-old male past medical history of diabetes, hypertension presenting with anxiety.  Patient notes sudden onset anxiety, dry mouth, lip twitching which started an hour ago while drinking green tea and states that is improving.  He states he has been increasingly anxious over the last several week since he was diagnosed with diabetes.  He denies any SI or HI notes normal p.o. intake.  Denies any chest pain, shortness breath, dizziness, nausea or vomiting or fevers.    Review of Systems   All other systems reviewed and are negative.        Objective       ED Triage Vitals [06/01/25 1232]   Temperature Pulse Blood Pressure Respirations SpO2 Patient Position - Orthostatic VS   97.8 °F (36.6 °C) (!) 106 (!) 177/79 20 100 % --      Temp src Heart Rate Source BP Location FiO2 (%) Pain Score    -- -- -- -- --      Vitals      Date and Time Temp Pulse SpO2 Resp BP Pain Score FACES Pain Rating User   06/01/25 1232 97.8 °F (36.6 °C) 106 100 % 20 177/79 -- -- [facility]            Physical Exam  Vitals and nursing note reviewed.   Constitutional:       General: He is not in acute distress.     Appearance: Normal appearance. He is well-developed. He is not ill-appearing.   HENT:      Head: Normocephalic and atraumatic.      Mouth/Throat:      Mouth: Mucous membranes are moist.     Eyes:      Conjunctiva/sclera: Conjunctivae normal.       Cardiovascular:      Rate and Rhythm: Regular rhythm. Tachycardia present.      Pulses: Normal pulses.      Heart sounds: Normal heart sounds. No murmur heard.  Pulmonary:      Effort: Pulmonary effort is normal. No respiratory distress.      Breath sounds: Normal breath sounds.   Abdominal:      General: Abdomen is flat.      Palpations: Abdomen is soft.      Tenderness: There is no abdominal tenderness.     Musculoskeletal:         General: No swelling. Normal range of motion.      Cervical back: Neck supple.      Right lower leg: No edema.      Left lower leg: No edema.     Skin:     General:  Skin is warm and dry.      Capillary Refill: Capillary refill takes less than 2 seconds.     Neurological:      General: No focal deficit present.      Mental Status: He is alert.     Psychiatric:         Mood and Affect: Mood normal.     Results Reviewed       None            No orders to display       ECG 12 Lead Documentation Only    Date/Time: 6/1/2025 1:03 PM    Performed by: Valentina Emerson DO  Authorized by: Valentina Emerson DO    Patient location:  ED  Previous ECG:     Previous ECG:  Unavailable  Interpretation:     Interpretation: non-specific    Quality:     Tracing quality:  Limited by artifact  Rate:     ECG rate assessment: normal    Rhythm:     Rhythm: sinus rhythm    Ectopy:     Ectopy: none    QRS:     QRS axis:  Normal    QRS intervals:  Wide  Conduction:     Conduction: abnormal      Abnormal conduction: non-specific intraventricular conduction delay    ST segments:     ST segments:  Normal  T waves:     T waves: non-specific        ED Medication and Procedure Management   Prior to Admission Medications   Prescriptions Last Dose Informant Patient Reported? Taking?   Lancets (freestyle) lancets   No No   Sig: Use as instructed   amLODIPine (NORVASC) 5 mg tablet   No No   Sig: Take 1 tablet (5 mg total) by mouth daily   insulin glargine (LANTUS) 100 units/mL subcutaneous injection   No No   Sig: Inject 20 Units under the skin daily at bedtime   lisinopril (ZESTRIL) 5 mg tablet   No No   Sig: Take 1 tablet (5 mg total) by mouth daily   metFORMIN (GLUCOPHAGE-XR) 500 mg 24 hr tablet   No No   Sig: Take 1 tablet (500 mg total) by mouth daily with dinner      Facility-Administered Medications: None     Patient's Medications   Discharge Prescriptions    No medications on file     No discharge procedures on file.  ED SEPSIS DOCUMENTATION              [1]   Past Medical History:  Diagnosis Date    Gout    [2] No past surgical history on file.  [3] No family history on file.  [4]   Social  History  Tobacco Use    Smoking status: Never    Smokeless tobacco: Never   Substance Use Topics    Alcohol use: Yes     Comment: occ    Drug use: No        Valentina Emerson,   06/01/25 1830